# Patient Record
Sex: FEMALE | Race: WHITE | NOT HISPANIC OR LATINO | Employment: FULL TIME | URBAN - METROPOLITAN AREA
[De-identification: names, ages, dates, MRNs, and addresses within clinical notes are randomized per-mention and may not be internally consistent; named-entity substitution may affect disease eponyms.]

---

## 2019-08-08 ENCOUNTER — HOSPITAL ENCOUNTER (EMERGENCY)
Facility: HOSPITAL | Age: 34
Discharge: HOME | End: 2019-08-08
Attending: EMERGENCY MEDICINE | Admitting: EMERGENCY MEDICINE

## 2019-08-08 ENCOUNTER — APPOINTMENT (OUTPATIENT)
Dept: ULTRASOUND IMAGING | Facility: HOSPITAL | Age: 34
End: 2019-08-08

## 2019-08-08 VITALS
DIASTOLIC BLOOD PRESSURE: 85 MMHG | OXYGEN SATURATION: 99 % | TEMPERATURE: 98.1 F | HEIGHT: 69 IN | SYSTOLIC BLOOD PRESSURE: 121 MMHG | RESPIRATION RATE: 16 BRPM | HEART RATE: 80 BPM | BODY MASS INDEX: 35.55 KG/M2 | WEIGHT: 240 LBS

## 2019-08-08 DIAGNOSIS — O20.0 THREATENED MISCARRIAGE IN EARLY PREGNANCY: Primary | ICD-10-CM

## 2019-08-08 LAB
ABO GROUP BLD: NORMAL
ANTI-C: NORMAL
BASOPHILS # BLD AUTO: 0.03 10*3/MM3 (ref 0–0.2)
BASOPHILS NFR BLD AUTO: 0.2 % (ref 0–1.5)
BLD GP AB SCN SERPL QL: POSITIVE
DEPRECATED RDW RBC AUTO: 43.6 FL (ref 37–54)
EOSINOPHIL # BLD AUTO: 0.17 10*3/MM3 (ref 0–0.4)
EOSINOPHIL NFR BLD AUTO: 1.4 % (ref 0.3–6.2)
ERYTHROCYTE [DISTWIDTH] IN BLOOD BY AUTOMATED COUNT: 13.4 % (ref 12.3–15.4)
HCG INTACT+B SERPL-ACNC: 407.4 MIU/ML
HCT VFR BLD AUTO: 44.8 % (ref 34–46.6)
HGB BLD-MCNC: 13.9 G/DL (ref 12–15.9)
IMM GRANULOCYTES # BLD AUTO: 0.09 10*3/MM3 (ref 0–0.05)
IMM GRANULOCYTES NFR BLD AUTO: 0.7 % (ref 0–0.5)
LYMPHOCYTES # BLD AUTO: 2.59 10*3/MM3 (ref 0.7–3.1)
LYMPHOCYTES NFR BLD AUTO: 21.4 % (ref 19.6–45.3)
MCH RBC QN AUTO: 27.8 PG (ref 26.6–33)
MCHC RBC AUTO-ENTMCNC: 31 G/DL (ref 31.5–35.7)
MCV RBC AUTO: 89.6 FL (ref 79–97)
MONOCYTES # BLD AUTO: 1.02 10*3/MM3 (ref 0.1–0.9)
MONOCYTES NFR BLD AUTO: 8.4 % (ref 5–12)
NEUTROPHILS # BLD AUTO: 8.21 10*3/MM3 (ref 1.7–7)
NEUTROPHILS NFR BLD AUTO: 67.9 % (ref 42.7–76)
NONSPECIFIC COLD ANTIBODY: NORMAL
NRBC BLD AUTO-RTO: 0 /100 WBC (ref 0–0.2)
NUMBER OF DOSES: NORMAL
PLATELET # BLD AUTO: 316 10*3/MM3 (ref 140–450)
PMV BLD AUTO: 9.3 FL (ref 6–12)
RBC # BLD AUTO: 5 10*6/MM3 (ref 3.77–5.28)
RH BLD: NEGATIVE
WBC NRBC COR # BLD: 12.11 10*3/MM3 (ref 3.4–10.8)

## 2019-08-08 PROCEDURE — 86905 BLOOD TYPING RBC ANTIGENS: CPT

## 2019-08-08 PROCEDURE — 86850 RBC ANTIBODY SCREEN: CPT | Performed by: PHYSICIAN ASSISTANT

## 2019-08-08 PROCEDURE — 25010000003 RHO D IMMUNE GLOBULIN 250 UNITS SOLUTION PREFILLED SYRINGE: Performed by: PHYSICIAN ASSISTANT

## 2019-08-08 PROCEDURE — 86901 BLOOD TYPING SEROLOGIC RH(D): CPT | Performed by: PHYSICIAN ASSISTANT

## 2019-08-08 PROCEDURE — 76817 TRANSVAGINAL US OBSTETRIC: CPT

## 2019-08-08 PROCEDURE — 85025 COMPLETE CBC W/AUTO DIFF WBC: CPT | Performed by: PHYSICIAN ASSISTANT

## 2019-08-08 PROCEDURE — 96372 THER/PROPH/DIAG INJ SC/IM: CPT

## 2019-08-08 PROCEDURE — 86900 BLOOD TYPING SEROLOGIC ABO: CPT | Performed by: PHYSICIAN ASSISTANT

## 2019-08-08 PROCEDURE — 86870 RBC ANTIBODY IDENTIFICATION: CPT | Performed by: PHYSICIAN ASSISTANT

## 2019-08-08 PROCEDURE — 99284 EMERGENCY DEPT VISIT MOD MDM: CPT

## 2019-08-08 PROCEDURE — 84702 CHORIONIC GONADOTROPIN TEST: CPT | Performed by: PHYSICIAN ASSISTANT

## 2019-08-08 RX ADMIN — HUMAN RHO(D) IMMUNE GLOBULIN 50 MCG: 50 INJECTION, SOLUTION INTRAMUSCULAR at 13:41

## 2019-08-08 NOTE — ED PROVIDER NOTES
Subjective   Pt is a 33 yo female presenting to ED with vaginal bleeding and lower abdominal pain. She took several urine pregnancy tests at home several days that were positive. For the past 3 days she has had some small amount of vaginal spotting but this morning woke up with lower abdominal cramping and worsening bleeding with some clots. LMP 7-6-19 and hx . She has not been evaluated by OB or had an US to confirm pregnancy. Pt denies dizziness, fever, chills, CP, SOB, nausea, vomiting, diarrhea, cough or burning with urination. PMHx significant for hearing impairment with cochlear implants.         Vaginal Bleeding - Pregnant   Quality:  Bright red and spotting  Duration:  3 days  Progression:  Worsening  Chronicity:  New  Pregnancy confirmed by ultrasound: no    Gestational age:  4-5 weeks  Prenatal care:  No prenatal care  Context: not after intercourse and not genital trauma    Relieved by:  Nothing  Worsened by:  Nothing  Ineffective treatments:  None tried  Associated symptoms: abdominal pain    Associated symptoms: no back pain, no dizziness, no dyspareunia, no dysuria, no fatigue, no fever, no nausea and no vaginal discharge    Risk factors: no bleeding disorder, no hx of ectopic pregnancy and no prior miscarriage        Review of Systems   Constitutional: Negative for chills, fatigue and fever.   HENT: Negative for congestion, ear pain, sore throat and trouble swallowing.    Eyes: Negative for pain, redness and visual disturbance.   Respiratory: Negative for cough, chest tightness and shortness of breath.    Cardiovascular: Negative for chest pain and leg swelling.   Gastrointestinal: Positive for abdominal pain. Negative for constipation, diarrhea, nausea and vomiting.   Genitourinary: Positive for vaginal bleeding. Negative for difficulty urinating, dyspareunia, dysuria, flank pain, hematuria and vaginal discharge.   Musculoskeletal: Negative for arthralgias, back pain and joint swelling.   Skin:  Negative for rash and wound.   Neurological: Negative for dizziness, syncope, speech difficulty, weakness, numbness and headaches.   Psychiatric/Behavioral: Negative for confusion.   All other systems reviewed and are negative.      Past Medical History:   Diagnosis Date   • Cochlear hearing loss        No Known Allergies    Past Surgical History:   Procedure Laterality Date   • DENTAL PROCEDURE         History reviewed. No pertinent family history.    Social History     Socioeconomic History   • Marital status: Single     Spouse name: Not on file   • Number of children: Not on file   • Years of education: Not on file   • Highest education level: Not on file   Tobacco Use   • Smoking status: Former Smoker   Substance and Sexual Activity   • Alcohol use: No     Frequency: Never   • Drug use: No   • Sexual activity: Yes           Objective   Physical Exam   Constitutional: She is oriented to person, place, and time. Vital signs are normal. She appears well-developed.   HENT:   Head: Atraumatic.   Nose: Nose normal.   Mouth/Throat: Mucous membranes are normal.   Eyes: Conjunctivae, EOM and lids are normal. Pupils are equal, round, and reactive to light.   Neck: Normal range of motion. Neck supple.   Cardiovascular: Normal rate, regular rhythm and normal heart sounds.   Pulmonary/Chest: Effort normal and breath sounds normal. She has no wheezes.   Abdominal: Soft. She exhibits no distension. There is tenderness in the suprapubic area. There is no rebound, no guarding and no CVA tenderness.   Musculoskeletal: Normal range of motion. She exhibits no edema or tenderness.   Neurological: She is alert and oriented to person, place, and time. No sensory deficit.   Skin: Skin is warm and dry. No rash noted. No erythema.   Psychiatric: She has a normal mood and affect. Her speech is normal and behavior is normal.   Nursing note and vitals reviewed.      Procedures           ED Course      Re-examined patient and discussed  results. HCG quant of 407 with US findings of possible gestational sac. Discussed possible early pregnancy or early miscarriage. Discussed need for close f/u with OBGYN with in 48 hours for evaluation and repeat HCG. Dose of Rhogam given of 50mcg since under 12 weeks.     Recent Results (from the past 24 hour(s))   hCG, Quantitative, Pregnancy    Collection Time: 19 10:04 AM   Result Value Ref Range    HCG Quantitative 407.40 mIU/mL   CBC Auto Differential    Collection Time: 19 10:04 AM   Result Value Ref Range    WBC 12.11 (H) 3.40 - 10.80 10*3/mm3    RBC 5.00 3.77 - 5.28 10*6/mm3    Hemoglobin 13.9 12.0 - 15.9 g/dL    Hematocrit 44.8 34.0 - 46.6 %    MCV 89.6 79.0 - 97.0 fL    MCH 27.8 26.6 - 33.0 pg    MCHC 31.0 (L) 31.5 - 35.7 g/dL    RDW 13.4 12.3 - 15.4 %    RDW-SD 43.6 37.0 - 54.0 fl    MPV 9.3 6.0 - 12.0 fL    Platelets 316 140 - 450 10*3/mm3    Neutrophil % 67.9 42.7 - 76.0 %    Lymphocyte % 21.4 19.6 - 45.3 %    Monocyte % 8.4 5.0 - 12.0 %    Eosinophil % 1.4 0.3 - 6.2 %    Basophil % 0.2 0.0 - 1.5 %    Immature Grans % 0.7 (H) 0.0 - 0.5 %    Neutrophils, Absolute 8.21 (H) 1.70 - 7.00 10*3/mm3    Lymphocytes, Absolute 2.59 0.70 - 3.10 10*3/mm3    Monocytes, Absolute 1.02 (H) 0.10 - 0.90 10*3/mm3    Eosinophils, Absolute 0.17 0.00 - 0.40 10*3/mm3    Basophils, Absolute 0.03 0.00 - 0.20 10*3/mm3    Immature Grans, Absolute 0.09 (H) 0.00 - 0.05 10*3/mm3    nRBC 0.0 0.0 - 0.2 /100 WBC    RhIg Evaluation    Collection Time: 19 10:07 AM   Result Value Ref Range    ABO Type A     RH type Negative     Antibody Screen Positive    Doses of Rh Immune Globulin    Collection Time: 19 10:07 AM   Result Value Ref Range    Number of Doses  Injection  - Recommend 1 vial of RhIg    Antibody Identification    Collection Time: 19 10:07 AM   Result Value Ref Range    Anti-C ANTI-C     Nonspecific Cold Antibody NONSPECIFIC COLD ANTIBODY      Note: In addition to lab results from  "this visit, the labs listed above may include labs taken at another facility or during a different encounter within the last 24 hours. Please correlate lab times with ED admission and discharge times for further clarification of the services performed during this visit.    US Ob Transvaginal   Preliminary Result   There is a small hypoechoic area in the endometrial canal   measuring 1.2 x 1.2 x 1.5 cm. This is presumably an early gestational   sac. Based on these measurements the estimated age would be 5 weeks, 3   days. There is no evidence of fetal pole or yolk sac.       D:  08/08/2019   E:  08/08/2019                 Vitals:    08/08/19 0953   BP: 138/78   BP Location: Left arm   Patient Position: Sitting   Pulse: 72   Resp: 16   Temp: 98 °F (36.7 °C)   TempSrc: Oral   SpO2: 100%   Weight: 109 kg (240 lb)   Height: 175.3 cm (69\")     Medications   Rho D Immune Globulin solution prefilled syringe 50 mcg (not administered)     ECG/EMG Results (last 24 hours)     ** No results found for the last 24 hours. **        No orders to display                   MDM      Final diagnoses:   Threatened miscarriage in early pregnancy            Antonina Irvin PA  08/08/19 7605    "

## 2019-08-09 LAB — COLD SCREEN: POSITIVE

## 2019-08-13 ENCOUNTER — APPOINTMENT (OUTPATIENT)
Dept: LAB | Facility: HOSPITAL | Age: 34
End: 2019-08-13

## 2019-08-13 ENCOUNTER — INITIAL PRENATAL (OUTPATIENT)
Dept: OBSTETRICS AND GYNECOLOGY | Facility: CLINIC | Age: 34
End: 2019-08-13

## 2019-08-13 ENCOUNTER — TELEPHONE (OUTPATIENT)
Dept: OBSTETRICS AND GYNECOLOGY | Facility: CLINIC | Age: 34
End: 2019-08-13

## 2019-08-13 VITALS — DIASTOLIC BLOOD PRESSURE: 76 MMHG | SYSTOLIC BLOOD PRESSURE: 120 MMHG | WEIGHT: 238 LBS | BODY MASS INDEX: 35.15 KG/M2

## 2019-08-13 DIAGNOSIS — E66.9 OBESITY (BMI 30-39.9): ICD-10-CM

## 2019-08-13 DIAGNOSIS — O20.0 THREATENED ABORTION: Primary | ICD-10-CM

## 2019-08-13 DIAGNOSIS — O09.529 ANTEPARTUM MULTIGRAVIDA OF ADVANCED MATERNAL AGE: ICD-10-CM

## 2019-08-13 LAB — HCG INTACT+B SERPL-ACNC: 353.3 MIU/ML

## 2019-08-13 PROCEDURE — 84702 CHORIONIC GONADOTROPIN TEST: CPT | Performed by: OBSTETRICS & GYNECOLOGY

## 2019-08-13 PROCEDURE — 36415 COLL VENOUS BLD VENIPUNCTURE: CPT | Performed by: OBSTETRICS & GYNECOLOGY

## 2019-08-13 PROCEDURE — 99204 OFFICE O/P NEW MOD 45 MIN: CPT | Performed by: OBSTETRICS & GYNECOLOGY

## 2019-08-13 NOTE — PROGRESS NOTES
Chief complaint:  Pregnancy care  History of present illness:  This patient is a 34-year-old  1 para 0.  She was initially seen in the emergency department on 2019 after having been a couple of days late for her menstrual period, having vaginal bleeding and cramping.  At this time her hCG level was 400.  A transvaginal ultrasound was performed which revealed a normal pelvis with a small likely gestational sac within the uterus.  She was advised to have short interval follow-up and presents today for follow-up.  An ultrasound was performed at [facility] which revealed a normal uterus with no evidence of intrauterine pregnancy.  The patient has had some bleeding but has resolved.  Her feeling of malaise and nausea has also resolved.  We discussed the likely diagnosis of spontaneous .  We discussed the etiologies of spontaneous  and the likelihood of this being based on a chromosomal abnormality.  We discussed the management of early pregnancy failure.  We discussed the possible need for dilatation and curettage.  We discussed the likelihood that we will be able to follow this spontaneous  with serial hCGs.  The patient desires pregnancy.  I advised her to use a barrier method of contraception until the spontaneous  has completely resolved and she has had a spontaneous menstrual cycle.  Her blood type is Rh- and she received RhoGam in the emergency department  Review of systems:  14 point reviewed, negative  Vital signs:  Reviewed  Physical exam:  Not performed  Impression:  Early pregnancy failure  Advanced maternal age  Obesity  Plan:  Serial hCG  Follow-up in 6 months to assess her menstrual pattern and fertility issues if necessary  The patient may require a curettage of the uterus should her hCG remain persistent or she have prolonged or heavy vaginal bleeding.  This was a 45-minute face-to-face encounter in which the evaluation and management of spontaneous   was discussed in the context of advanced maternal age and maternal obesity.We discussed the likely diagnosis of spontaneous .  We discussed the etiologies of spontaneous  and the likelihood of this being based on a chromosomal abnormality.  We discussed the management of early pregnancy failure.  We discussed the possible need for dilatation and curettage.  We discussed the likelihood that we will be able to follow this spontaneous  with serial hCGs.

## 2019-08-13 NOTE — TELEPHONE ENCOUNTER
----- Message from Alfonzo Leone MD sent at 8/13/2019  2:14 PM EDT -----  Advise down some as expected. Lets repeat the value in one week. She can anticipate a heavier and crampier period in the next week or so. Order placed

## 2019-08-14 ENCOUNTER — OFFICE VISIT (OUTPATIENT)
Dept: INTERNAL MEDICINE | Facility: CLINIC | Age: 34
End: 2019-08-14

## 2019-08-14 ENCOUNTER — TELEPHONE (OUTPATIENT)
Dept: INTERNAL MEDICINE | Facility: CLINIC | Age: 34
End: 2019-08-14

## 2019-08-14 VITALS
HEIGHT: 69 IN | WEIGHT: 237.4 LBS | SYSTOLIC BLOOD PRESSURE: 160 MMHG | HEART RATE: 92 BPM | DIASTOLIC BLOOD PRESSURE: 108 MMHG | OXYGEN SATURATION: 96 % | RESPIRATION RATE: 16 BRPM | TEMPERATURE: 97.8 F | BODY MASS INDEX: 35.16 KG/M2

## 2019-08-14 DIAGNOSIS — Z13.220 LIPID SCREENING: ICD-10-CM

## 2019-08-14 DIAGNOSIS — E53.8 VITAMIN B 12 DEFICIENCY: ICD-10-CM

## 2019-08-14 DIAGNOSIS — E55.9 VITAMIN D DEFICIENCY: ICD-10-CM

## 2019-08-14 DIAGNOSIS — F33.1 MODERATE EPISODE OF RECURRENT MAJOR DEPRESSIVE DISORDER (HCC): ICD-10-CM

## 2019-08-14 DIAGNOSIS — D22.9 NEVUS: ICD-10-CM

## 2019-08-14 DIAGNOSIS — R53.83 FATIGUE, UNSPECIFIED TYPE: ICD-10-CM

## 2019-08-14 DIAGNOSIS — F41.9 ANXIETY: Primary | ICD-10-CM

## 2019-08-14 LAB
25(OH)D3 SERPL-MCNC: 28.6 NG/ML (ref 30–100)
ALBUMIN SERPL-MCNC: 4.6 G/DL (ref 3.5–5.2)
ALBUMIN/GLOB SERPL: 1.6 G/DL
ALP SERPL-CCNC: 60 U/L (ref 39–117)
ALT SERPL W P-5'-P-CCNC: 12 U/L (ref 1–33)
ANION GAP SERPL CALCULATED.3IONS-SCNC: 13.7 MMOL/L (ref 5–15)
AST SERPL-CCNC: 12 U/L (ref 1–32)
BILIRUB SERPL-MCNC: 0.3 MG/DL (ref 0.2–1.2)
BUN BLD-MCNC: 8 MG/DL (ref 6–20)
BUN/CREAT SERPL: 14 (ref 7–25)
CALCIUM SPEC-SCNC: 9.5 MG/DL (ref 8.6–10.5)
CHLORIDE SERPL-SCNC: 102 MMOL/L (ref 98–107)
CHOLEST SERPL-MCNC: 225 MG/DL (ref 0–200)
CO2 SERPL-SCNC: 24.3 MMOL/L (ref 22–29)
CREAT BLD-MCNC: 0.57 MG/DL (ref 0.57–1)
DEPRECATED RDW RBC AUTO: 45.7 FL (ref 37–54)
ERYTHROCYTE [DISTWIDTH] IN BLOOD BY AUTOMATED COUNT: 13.4 % (ref 12.3–15.4)
GFR SERPL CREATININE-BSD FRML MDRD: 121 ML/MIN/1.73
GLOBULIN UR ELPH-MCNC: 2.8 GM/DL
GLUCOSE BLD-MCNC: 86 MG/DL (ref 65–99)
HCT VFR BLD AUTO: 46.7 % (ref 34–46.6)
HDLC SERPL-MCNC: 72 MG/DL (ref 40–60)
HGB BLD-MCNC: 14.4 G/DL (ref 12–15.9)
LDLC SERPL CALC-MCNC: 125 MG/DL (ref 0–100)
LDLC/HDLC SERPL: 1.74 {RATIO}
MCH RBC QN AUTO: 28.2 PG (ref 26.6–33)
MCHC RBC AUTO-ENTMCNC: 30.8 G/DL (ref 31.5–35.7)
MCV RBC AUTO: 91.4 FL (ref 79–97)
PLATELET # BLD AUTO: 336 10*3/MM3 (ref 140–450)
PMV BLD AUTO: 10.3 FL (ref 6–12)
POTASSIUM BLD-SCNC: 4.1 MMOL/L (ref 3.5–5.2)
PROT SERPL-MCNC: 7.4 G/DL (ref 6–8.5)
RBC # BLD AUTO: 5.11 10*6/MM3 (ref 3.77–5.28)
SODIUM BLD-SCNC: 140 MMOL/L (ref 136–145)
T4 FREE SERPL-MCNC: 1.17 NG/DL (ref 0.93–1.7)
TRIGL SERPL-MCNC: 140 MG/DL (ref 0–150)
TSH SERPL DL<=0.05 MIU/L-ACNC: 1.14 MIU/ML (ref 0.27–4.2)
VIT B12 BLD-MCNC: 287 PG/ML (ref 211–946)
VLDLC SERPL-MCNC: 28 MG/DL (ref 5–40)
WBC NRBC COR # BLD: 11.32 10*3/MM3 (ref 3.4–10.8)

## 2019-08-14 PROCEDURE — 84443 ASSAY THYROID STIM HORMONE: CPT | Performed by: INTERNAL MEDICINE

## 2019-08-14 PROCEDURE — 82607 VITAMIN B-12: CPT | Performed by: INTERNAL MEDICINE

## 2019-08-14 PROCEDURE — 82306 VITAMIN D 25 HYDROXY: CPT | Performed by: INTERNAL MEDICINE

## 2019-08-14 PROCEDURE — 99203 OFFICE O/P NEW LOW 30 MIN: CPT | Performed by: INTERNAL MEDICINE

## 2019-08-14 PROCEDURE — 84439 ASSAY OF FREE THYROXINE: CPT | Performed by: INTERNAL MEDICINE

## 2019-08-14 PROCEDURE — 80061 LIPID PANEL: CPT | Performed by: INTERNAL MEDICINE

## 2019-08-14 PROCEDURE — 85027 COMPLETE CBC AUTOMATED: CPT | Performed by: INTERNAL MEDICINE

## 2019-08-14 PROCEDURE — 36415 COLL VENOUS BLD VENIPUNCTURE: CPT | Performed by: INTERNAL MEDICINE

## 2019-08-14 PROCEDURE — 80053 COMPREHEN METABOLIC PANEL: CPT | Performed by: INTERNAL MEDICINE

## 2019-08-14 RX ORDER — ESCITALOPRAM OXALATE 10 MG/1
10 TABLET ORAL DAILY
Qty: 30 TABLET | Refills: 3 | Status: SHIPPED | OUTPATIENT
Start: 2019-08-14 | End: 2019-08-16 | Stop reason: SDUPTHER

## 2019-08-14 NOTE — PROGRESS NOTES
Subjective   Bonita Ellison is a 34 y.o. female.     History of Present Illness     The following portions of the patient's history were reviewed and updated as appropriate: allergies, current medications, past family history, past medical history, past social history, past surgical history and problem list.    Establish visit    1.White blood count elevation- she says that her white count has been between 14-17 but it is never normal.  Patient says that she would like to have this checked to make sure everything is okay.    2 fatigue/crying- over the past 6 to 8 months patient has been having increased fatigue, crying episodes, feeling down, feeling isolated, and is concerned about the possibility of depression.  Patient denies any suicidal ideations, threats towards herself or anybody else, no hallucinations.    3 hair is falling out-during the stressful time she has been losing clumps of hair and denies any changes in her cosmetic products.    4 mole on skin-patient has a mole on her left forearm and also around the buttocks region.      Past medical History   Anxiety   Depression     Family History   Arthritis  Diabetes  Stroke  Obesity  Hyperlipidemia    Social History: + Cigarette smoking, no EtOH consumption, no IV drug use, no marijuana, no illicit drugs.        Review of Systems   Constitutional: Negative.    HENT: Negative.    Respiratory: Negative.    Endocrine: Negative.    Genitourinary: Negative.    Neurological: Negative for dizziness.   Psychiatric/Behavioral: Positive for decreased concentration, depressed mood and stress. Negative for hallucinations, self-injury, sleep disturbance and suicidal ideas. The patient is nervous/anxious.    All other systems reviewed and are negative.      Objective   Physical Exam   Constitutional: She is oriented to person, place, and time. She appears well-developed and well-nourished.   HENT:   Head: Normocephalic.   Right Ear: External ear normal.   Left Ear: External  ear normal.   Nose: Nose normal.   Mouth/Throat: Oropharynx is clear and moist.   Eyes: Conjunctivae and EOM are normal. Pupils are equal, round, and reactive to light.   Neck: Normal range of motion. Neck supple.   Cardiovascular: Normal rate, regular rhythm and normal heart sounds.   Pulmonary/Chest: Effort normal and breath sounds normal.   Abdominal: Soft. Bowel sounds are normal.   Musculoskeletal: Normal range of motion.   Neurological: She is alert and oriented to person, place, and time.   Skin: Skin is warm.   Psychiatric: She has a normal mood and affect. Her behavior is normal. Judgment and thought content normal.   Nursing note and vitals reviewed.        Assessment/Plan   Bonita was seen today for establish care and miscarriage.    Diagnoses and all orders for this visit:    Spent 30 minutes of 30 minutes face-to-face with patient, greater than 50% of that time was used to discuss chronic issues including depression/anxiety and management.    Anxiety  -     CBC (No Diff)  -     Comprehensive Metabolic Panel  -     T4, Free  -     TSH  -     escitalopram (LEXAPRO) 10 MG tablet; Take 1 tablet by mouth Daily.    Moderate episode of recurrent major depressive disorder (CMS/HCC)  -     escitalopram (LEXAPRO) 10 MG tablet; Take 1 tablet by mouth Daily.    Side effects and precautions of the new medication(s) have been discussed with patient  I have answered patients questions thoroughly to their understanding.  If patient should experience any side effects from the medication, a follow up appointment should be made.      Lipid screening  -     Lipid Panel    Fatigue, unspecified type    Vitamin B 12 deficiency  -     Vitamin B12; Future    Vitamin D deficiency  -     Vitamin D 25 hydroxy; Future

## 2019-08-14 NOTE — TELEPHONE ENCOUNTER
----- Message from Gracia Huston sent at 8/14/2019 12:00 PM EDT -----  At checkout patient requested we call [phone] when we have her information for her dermatologist appointment.

## 2019-08-15 ENCOUNTER — TELEPHONE (OUTPATIENT)
Dept: INTERNAL MEDICINE | Facility: CLINIC | Age: 34
End: 2019-08-15

## 2019-08-15 DIAGNOSIS — F33.1 MODERATE EPISODE OF RECURRENT MAJOR DEPRESSIVE DISORDER (HCC): ICD-10-CM

## 2019-08-15 DIAGNOSIS — F41.9 ANXIETY: ICD-10-CM

## 2019-08-15 NOTE — TELEPHONE ENCOUNTER
----- Message from Hannah Izaguirre sent at 8/15/2019 11:02 AM EDT -----  Contact: Pt's mother  The pharmacy never received the RX for her escitalopram (LEXAPRO) 10 MG tablet.  Can it be sent again.    To [facility] in [place], [place]

## 2019-08-15 NOTE — TELEPHONE ENCOUNTER
Patient's mother called and was advised to have patient have repeat lab work done in one week.  She would also anticipate a heavier and crampier period in the next week or so

## 2019-08-15 NOTE — TELEPHONE ENCOUNTER
Looks like it was printed and not called in. I called [facility] pharmacy and they stated that patient has not ever used their pharmacy before. Tried to call patient no answer LVM to CB.

## 2019-08-16 RX ORDER — ESCITALOPRAM OXALATE 10 MG/1
10 TABLET ORAL DAILY
Qty: 30 TABLET | Refills: 3 | Status: SHIPPED | OUTPATIENT
Start: 2019-08-16 | End: 2023-01-24

## 2019-08-16 NOTE — TELEPHONE ENCOUNTER
Looks like Dr Cunningham [facility] [place].  Left detailed message that Bonita can call [facility] and have them transfer the rx from [facility] to [facility] and to call the on call # if there is a problem as Dr cunningham in on call this weekend

## 2019-08-16 NOTE — TELEPHONE ENCOUNTER
Patient called and states the medication needs to be sent to [facility]. She can be reached at [phone]

## 2019-08-21 ENCOUNTER — TELEPHONE (OUTPATIENT)
Dept: OBSTETRICS AND GYNECOLOGY | Facility: CLINIC | Age: 34
End: 2019-08-21

## 2019-08-21 NOTE — TELEPHONE ENCOUNTER
Patient's mother advised that patient needs repeat labs done.  She may also take advil or ibuprofen for pain.  She was also told in previous telephone call to expect severe cramping and bleeding.  Bleeding may continue until HCG level have reached normal level.  I also asked patient's mother what her temperature was.  She stated patient doesn't have thermometer.  Patient is in [place] and mother is in [place] trying to take care of patient from a distant.  Mother states she will have patient to repeat labs today or tomorrow. She will call office to update us.  She may also need a note for work.

## 2019-08-21 NOTE — TELEPHONE ENCOUNTER
ODESSA PT: Pt still bleeding after miscarriage/ cramping really bad, slight fever, weak,nausa.Her mother feels like she needs to be seen before 9-23.  Mother stated Dr lourdes lynch D&C was not needed. Patient states she is in so much pain. Please call mother at [phone].

## 2019-08-22 ENCOUNTER — LAB (OUTPATIENT)
Dept: LAB | Facility: HOSPITAL | Age: 34
End: 2019-08-22

## 2019-08-22 DIAGNOSIS — O20.0 THREATENED ABORTION: ICD-10-CM

## 2019-08-22 LAB — HCG INTACT+B SERPL-ACNC: 455.4 MIU/ML

## 2019-08-22 PROCEDURE — 84702 CHORIONIC GONADOTROPIN TEST: CPT

## 2019-08-22 PROCEDURE — 36415 COLL VENOUS BLD VENIPUNCTURE: CPT

## 2019-08-23 ENCOUNTER — DOCUMENTATION (OUTPATIENT)
Dept: OBSTETRICS AND GYNECOLOGY | Facility: CLINIC | Age: 34
End: 2019-08-23

## 2019-08-23 ENCOUNTER — HOSPITAL ENCOUNTER (OUTPATIENT)
Dept: ONCOLOGY | Facility: HOSPITAL | Age: 34
Setting detail: INFUSION SERIES
Discharge: HOME | End: 2019-08-23

## 2019-08-23 VITALS
WEIGHT: 241 LBS | SYSTOLIC BLOOD PRESSURE: 155 MMHG | DIASTOLIC BLOOD PRESSURE: 79 MMHG | RESPIRATION RATE: 16 BRPM | HEART RATE: 78 BPM | TEMPERATURE: 98.5 F | BODY MASS INDEX: 35.7 KG/M2 | HEIGHT: 69 IN

## 2019-08-23 DIAGNOSIS — O02.1 MISSED ABORTION: ICD-10-CM

## 2019-08-23 DIAGNOSIS — Z34.90 PREGNANCY, UNSPECIFIED GESTATIONAL AGE: Primary | ICD-10-CM

## 2019-08-23 DIAGNOSIS — O36.80X0 PREGNANCY, LOCATION UNKNOWN: Primary | ICD-10-CM

## 2019-08-23 DIAGNOSIS — O20.0 THREATENED ABORTION: Primary | ICD-10-CM

## 2019-08-23 PROCEDURE — 96401 CHEMO ANTI-NEOPL SQ/IM: CPT

## 2019-08-23 PROCEDURE — 25010000003 METHOTREXATE SODIUM PER 50 MG: Performed by: OBSTETRICS & GYNECOLOGY

## 2019-08-23 RX ORDER — METHOTREXATE 25 MG/ML
50 INJECTION INTRA-ARTERIAL; INTRAMUSCULAR; INTRATHECAL; INTRAVENOUS ONCE
Status: COMPLETED | OUTPATIENT
Start: 2019-08-23 | End: 2019-08-23

## 2019-08-23 RX ORDER — METHOTREXATE 25 MG/ML
50 INJECTION INTRA-ARTERIAL; INTRAMUSCULAR; INTRATHECAL; INTRAVENOUS ONCE
Status: CANCELLED | OUTPATIENT
Start: 2019-08-23

## 2019-08-23 RX ORDER — METHOTREXATE 25 MG/ML
50 INJECTION INTRA-ARTERIAL; INTRAMUSCULAR; INTRATHECAL; INTRAVENOUS ONCE
Status: DISCONTINUED | OUTPATIENT
Start: 2019-08-23 | End: 2023-01-24

## 2019-08-23 RX ADMIN — METHOTREXATE 112 MG: 25 SOLUTION INTRA-ARTERIAL; INTRAMUSCULAR; INTRATHECAL; INTRAVENOUS at 16:44

## 2019-08-23 NOTE — PROGRESS NOTES
Labs reviewed  HCG levels are not falling as one would expect with a complete .  I believe that she had evidence of an IUP on ED performed ultrasound which not only did not show progress on repeat but was in fact absent.  Most likely I think we are dealing an early failure of an IUP, but this is not absolute. Clearly this is a failed early pregnancy which I will address as one of unknown anatomic location.  She is currently in a stable condition and through her mother I discussed treatment options. All questions were answered. ( the patient has profound hearing loss and communication was vis her mother)  We will proceed with MTX, 50mg/M2 and follow serial hcg values. Warning signs of ectopic were communicated.  Repeat hcg 1 week

## 2019-08-23 NOTE — PROGRESS NOTES
Patient has cochlear implant, cannot hear over the phone.  Her mom spoke with MD office earlier today re: patient's labs, MTx  Treatment.  I Spoke with Dr Leone's nurse to clarify that patient was educated on her condition.  Patient's questions were answered and she was comfortable receiving the MTX treament injection.  Explained that she should call MD office re: f/o labs. Patient v/u.

## 2019-08-26 ENCOUNTER — LAB (OUTPATIENT)
Dept: LAB | Facility: HOSPITAL | Age: 34
End: 2019-08-26

## 2019-08-26 ENCOUNTER — TELEPHONE (OUTPATIENT)
Dept: OBSTETRICS AND GYNECOLOGY | Facility: CLINIC | Age: 34
End: 2019-08-26

## 2019-08-26 DIAGNOSIS — O20.0 THREATENED ABORTION: ICD-10-CM

## 2019-08-26 DIAGNOSIS — O02.1 MISSED ABORTION: Primary | ICD-10-CM

## 2019-08-26 LAB — HCG INTACT+B SERPL-ACNC: 375.1 MIU/ML

## 2019-08-26 PROCEDURE — 84702 CHORIONIC GONADOTROPIN TEST: CPT

## 2019-08-26 PROCEDURE — 36415 COLL VENOUS BLD VENIPUNCTURE: CPT

## 2019-08-26 NOTE — TELEPHONE ENCOUNTER
----- Message from Alfonzo Leone MD sent at 8/26/2019  2:16 PM EDT -----  Advise hcg levels beginning to fall as expected. Needs repeat in one week. Ordrered.

## 2019-08-26 NOTE — TELEPHONE ENCOUNTER
Mother calling about shot she got Friday and lab work needed (HCG) Information given as to where she needs to go

## 2019-08-27 ENCOUNTER — TELEPHONE (OUTPATIENT)
Dept: ONCOLOGY | Facility: HOSPITAL | Age: 34
End: 2019-08-27

## 2019-08-30 NOTE — TELEPHONE ENCOUNTER
Patient has significant hearing loss and has signed forms to give her mother, Carmencita Li,  access to medical care needs.   Patient's mother stated that Bonita has had some spotting, soreness in lower abdomen, low grade fever.  She had labs- beta hcg  drawn yesterday and is waiting for f/u from MD office. Emotionally she states Bonita is doing okay.  Mother states she  wants to talk with physician more about her daughter's  condition.  Encouraged mother/ patient to call the MD office and explain concerns and have questions answered.   Patient's mother verbalized understanding.   Begin with liquids and light food ( tea, toast, Jello, soups). Advance to what you normally eat. Liquids should taken in adequate amounts today.     CALL the DOCTOR:    -Fever greater than  101F  - Signs  of infection such as : increase pain,swelling,redness,or a bad  smell coming from the wound.  -Excessive amount of bleeding.  - Any pain that appears to be getting worse.  - Vomiting  -  If you have  not urinated 8 hours after surgery or have any difficulty urinating.     A responsible adult should be with you for the rest of the day and night for your safety and to help you if you needed.    Review attached FACT SHEET if applicable.

## 2019-09-02 ENCOUNTER — LAB (OUTPATIENT)
Dept: LAB | Facility: HOSPITAL | Age: 34
End: 2019-09-02

## 2019-09-02 DIAGNOSIS — O02.1 MISSED ABORTION: ICD-10-CM

## 2019-09-02 LAB — HCG INTACT+B SERPL-ACNC: 126.7 MIU/ML

## 2019-09-02 PROCEDURE — 36415 COLL VENOUS BLD VENIPUNCTURE: CPT

## 2019-09-02 PROCEDURE — 84702 CHORIONIC GONADOTROPIN TEST: CPT

## 2019-09-03 ENCOUNTER — TELEPHONE (OUTPATIENT)
Dept: OBSTETRICS AND GYNECOLOGY | Facility: CLINIC | Age: 34
End: 2019-09-03

## 2019-09-03 DIAGNOSIS — O02.1 MISSED ABORTION: Primary | ICD-10-CM

## 2019-09-03 NOTE — TELEPHONE ENCOUNTER
----- Message from Alfonzo Leone MD sent at 9/3/2019 12:44 PM EDT -----  HCG continues to fall as expected. Repeat in one week and weekly until zero. Order placed

## 2019-09-10 ENCOUNTER — LAB (OUTPATIENT)
Dept: LAB | Facility: HOSPITAL | Age: 34
End: 2019-09-10

## 2019-09-10 DIAGNOSIS — O02.1 MISSED ABORTION: ICD-10-CM

## 2019-09-10 PROCEDURE — 84702 CHORIONIC GONADOTROPIN TEST: CPT

## 2019-09-10 PROCEDURE — 36415 COLL VENOUS BLD VENIPUNCTURE: CPT

## 2019-09-11 ENCOUNTER — TELEPHONE (OUTPATIENT)
Dept: OBSTETRICS AND GYNECOLOGY | Facility: CLINIC | Age: 34
End: 2019-09-11

## 2019-09-11 DIAGNOSIS — O02.1 MISSED ABORTION: Primary | ICD-10-CM

## 2019-09-11 LAB — HCG INTACT+B SERPL-ACNC: 30.2 MIU/ML

## 2019-09-11 NOTE — TELEPHONE ENCOUNTER
----- Message from Alfonzo Leone MD sent at 9/11/2019  9:45 AM EDT -----  Advise. Would liike to rep[eat in one week, Hopefully zero by then and follow up appt in about three months if not already scheduled sooner

## 2019-09-17 ENCOUNTER — LAB (OUTPATIENT)
Dept: LAB | Facility: HOSPITAL | Age: 34
End: 2019-09-17

## 2019-09-17 DIAGNOSIS — O02.1 MISSED ABORTION: ICD-10-CM

## 2019-09-17 PROCEDURE — 84702 CHORIONIC GONADOTROPIN TEST: CPT

## 2019-09-17 PROCEDURE — 36415 COLL VENOUS BLD VENIPUNCTURE: CPT

## 2019-09-18 DIAGNOSIS — O02.1 MISSED ABORTION: Primary | ICD-10-CM

## 2019-09-18 LAB — HCG INTACT+B SERPL-ACNC: 9.67 MIU/ML

## 2019-09-23 ENCOUNTER — APPOINTMENT (OUTPATIENT)
Dept: LAB | Facility: HOSPITAL | Age: 34
End: 2019-09-23

## 2019-09-23 ENCOUNTER — OFFICE VISIT (OUTPATIENT)
Dept: OBSTETRICS AND GYNECOLOGY | Facility: CLINIC | Age: 34
End: 2019-09-23

## 2019-09-23 VITALS
DIASTOLIC BLOOD PRESSURE: 76 MMHG | WEIGHT: 241 LBS | HEIGHT: 69 IN | BODY MASS INDEX: 35.7 KG/M2 | SYSTOLIC BLOOD PRESSURE: 120 MMHG

## 2019-09-23 DIAGNOSIS — O02.1 MISSED ABORTION: ICD-10-CM

## 2019-09-23 DIAGNOSIS — E66.9 OBESITY (BMI 30-39.9): Primary | ICD-10-CM

## 2019-09-23 LAB — HCG INTACT+B SERPL-ACNC: 3.81 MIU/ML

## 2019-09-23 PROCEDURE — 36415 COLL VENOUS BLD VENIPUNCTURE: CPT | Performed by: OBSTETRICS & GYNECOLOGY

## 2019-09-23 PROCEDURE — 99213 OFFICE O/P EST LOW 20 MIN: CPT | Performed by: OBSTETRICS & GYNECOLOGY

## 2019-09-23 PROCEDURE — 84702 CHORIONIC GONADOTROPIN TEST: CPT | Performed by: OBSTETRICS & GYNECOLOGY

## 2019-09-23 RX ORDER — PRENATAL WITH FERROUS FUM AND FOLIC ACID 3080; 920; 120; 400; 22; 1.84; 3; 20; 10; 1; 12; 200; 27; 25; 2 [IU]/1; [IU]/1; MG/1; [IU]/1; MG/1; MG/1; MG/1; MG/1; MG/1; MG/1; UG/1; MG/1; MG/1; MG/1; MG/1
1 TABLET ORAL DAILY
Qty: 100 TABLET | Refills: 5 | Status: SHIPPED | OUTPATIENT
Start: 2019-09-23 | End: 2019-10-18

## 2019-09-23 NOTE — PROGRESS NOTES
Chief complaint:  Follow-up early pregnancy failure  History of present illness:  This patient was treated with methotrexate for early pregnancy failure possible ectopic pregnancy.  Her hCGs have almost fallen to 0.  She has not had a normal period.  She has resumed sexual activity she has normal bowel and bladder function we discussed future pregnancy.  We discussed contraception.  We discussed continuing prenatal vitamins.  We discussed her resuming Celexa.  She is due for an annual exam.  She is in general doing well from a state of mind standpoint  Review of systems:  14 point reviewed negative  Vital signs:  Reviewed  Physical exam: Not performed  Impression:  Early pregnancy failure essentially resolved  Plan:  Continue prenatal vitamins  Continue Celexa  Follow-up in 3 months for annual exam  hCG today

## 2019-09-24 ENCOUNTER — TELEPHONE (OUTPATIENT)
Dept: OBSTETRICS AND GYNECOLOGY | Facility: CLINIC | Age: 34
End: 2019-09-24

## 2019-10-18 ENCOUNTER — OFFICE VISIT (OUTPATIENT)
Dept: INTERNAL MEDICINE | Facility: CLINIC | Age: 34
End: 2019-10-18

## 2019-10-18 VITALS
DIASTOLIC BLOOD PRESSURE: 80 MMHG | TEMPERATURE: 97.6 F | OXYGEN SATURATION: 99 % | RESPIRATION RATE: 20 BRPM | HEART RATE: 90 BPM | WEIGHT: 240 LBS | BODY MASS INDEX: 35.55 KG/M2 | SYSTOLIC BLOOD PRESSURE: 128 MMHG | HEIGHT: 69 IN

## 2019-10-18 DIAGNOSIS — M54.31 RIGHT SCIATIC NERVE PAIN: ICD-10-CM

## 2019-10-18 DIAGNOSIS — M54.41 CHRONIC RIGHT-SIDED LOW BACK PAIN WITH RIGHT-SIDED SCIATICA: Primary | ICD-10-CM

## 2019-10-18 DIAGNOSIS — G89.29 CHRONIC RIGHT-SIDED LOW BACK PAIN WITH RIGHT-SIDED SCIATICA: Primary | ICD-10-CM

## 2019-10-18 PROCEDURE — 99214 OFFICE O/P EST MOD 30 MIN: CPT | Performed by: INTERNAL MEDICINE

## 2019-10-18 RX ORDER — GABAPENTIN 300 MG/1
CAPSULE ORAL
Qty: 45 CAPSULE | Refills: 1 | Status: SHIPPED | OUTPATIENT
Start: 2019-10-18 | End: 2019-10-28

## 2019-10-18 RX ORDER — MELOXICAM 15 MG/1
15 TABLET ORAL DAILY
Qty: 30 TABLET | Refills: 3 | Status: SHIPPED | OUTPATIENT
Start: 2019-10-18 | End: 2023-01-24

## 2019-10-18 NOTE — PROGRESS NOTES
Subjective   Bonita Ellison is a 34 y.o. female.     History of Present Illness     The following portions of the patient's history were reviewed and updated as appropriate: allergies, current medications, past family history, past medical history, past social history, past surgical history and problem list.    Low back pain acute  Patient says that she was at work when she developed   Localized to the right gluteal and radiates the right leg.  Patient says that the pain is worse when she is standing and sometimes relieved when she sits.  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  Patient says that she has tried aspirin medication and this has helped somewhat with the anti-inflammatory      Review of Systems   All other systems reviewed and are negative.      Objective   Physical Exam   Constitutional: She is oriented to person, place, and time. She appears well-developed and well-nourished.   HENT:   Head: Normocephalic and atraumatic.   Nose: Nose normal.   Mouth/Throat: Oropharynx is clear and moist.   Eyes: Conjunctivae and EOM are normal. Pupils are equal, round, and reactive to light.   Neck: Normal range of motion. Neck supple.   Cardiovascular: Normal rate, regular rhythm and normal heart sounds.   Pulmonary/Chest: Effort normal and breath sounds normal.   Musculoskeletal: Normal range of motion.   Point tenderness to the right gluteal region   Neurological: She is alert and oriented to person, place, and time.   Nursing note and vitals reviewed.        Assessment/Plan   Bonita was seen today for back pain.    Diagnoses and all orders for this visit:    Chronic right-sided low back pain with right-sided sciatica    Right sciatic nerve pain (new medication start)  -     meloxicam (MOBIC) 15 MG tablet; Take 1 tablet by mouth Daily.  -     gabapentin (NEURONTIN) 300 MG capsule; Take 1 tablet by mouth twice a day  as needed for nerve pain    Side effects and precautions of the new medication(s) have been discussed with patient  I have answered patients questions thoroughly to their understanding.  If patient should experience any side effects from the medication, a follow up appointment should be made.

## 2019-10-28 ENCOUNTER — OFFICE VISIT (OUTPATIENT)
Dept: INTERNAL MEDICINE | Facility: CLINIC | Age: 34
End: 2019-10-28

## 2019-10-28 ENCOUNTER — TELEPHONE (OUTPATIENT)
Dept: INTERNAL MEDICINE | Facility: CLINIC | Age: 34
End: 2019-10-28

## 2019-10-28 VITALS
DIASTOLIC BLOOD PRESSURE: 80 MMHG | RESPIRATION RATE: 20 BRPM | BODY MASS INDEX: 35.94 KG/M2 | TEMPERATURE: 97.7 F | OXYGEN SATURATION: 98 % | WEIGHT: 243.38 LBS | HEART RATE: 67 BPM | SYSTOLIC BLOOD PRESSURE: 126 MMHG

## 2019-10-28 DIAGNOSIS — M54.31 RIGHT SCIATIC NERVE PAIN: ICD-10-CM

## 2019-10-28 DIAGNOSIS — M54.41 CHRONIC RIGHT-SIDED LOW BACK PAIN WITH RIGHT-SIDED SCIATICA: Primary | ICD-10-CM

## 2019-10-28 DIAGNOSIS — G89.29 CHRONIC RIGHT-SIDED LOW BACK PAIN WITH RIGHT-SIDED SCIATICA: Primary | ICD-10-CM

## 2019-10-28 PROCEDURE — 99213 OFFICE O/P EST LOW 20 MIN: CPT | Performed by: INTERNAL MEDICINE

## 2019-10-28 RX ORDER — GABAPENTIN 600 MG/1
600 TABLET ORAL 3 TIMES DAILY
Qty: 90 TABLET | Refills: 0 | Status: SHIPPED | OUTPATIENT
Start: 2019-10-28 | End: 2023-01-24

## 2019-10-28 NOTE — TELEPHONE ENCOUNTER
Spoke with mom and told her to make an appt with Dr Nash and if there is no openings she can be scheduled with another provider, She was transferred to [facility] for appt.

## 2019-10-28 NOTE — TELEPHONE ENCOUNTER
PATIENT'S MOTHER CALLED IN REGARDS TO A LETTER THAT THE PATIENT RECEIVED FROM HER EMPLOYER STATING THAT IF SHE DID NOT GET IN TO SEE HER PRIMARY CARED DOCTOR BY NOV,15,2019 SHE COULD BE FIRED. SO THE PT'S MOTHER WOULD LIKE TO GET THE PATIENT SCHEDULED AS SOON AS POSSIBLE AND ALSO GET A LETTER FROM HER PROVIDER STATING THAT SHE WAS IN THE OFFICE. MRS WELCH WOULD LIKE TO GET A CALL BACK AS SOON AS POSSIBLE IN REGARDS TO THIS MATTER [phone]

## 2019-10-28 NOTE — PROGRESS NOTES
Subjective   Bonita Ellison is a 34 y.o. female.     History of Present Illness     The following portions of the patient's history were reviewed and updated as appropriate: allergies, current medications, past family history, past medical history, past social history, past surgical history and problem list.      1 low back pain/ right leg-patient says that her right leg continues to cause pain.  She continues her pain medications which has helped improve her overall quality life and her ability to perform activities of daily living.    2 knee pain chronic stable.  Patient continues to have knee pain.  Pain is made worse with flexion, extension, and weightbearing.    She is continue with physical therapy   Review of Systems   All other systems reviewed and are negative.      Objective   Physical Exam   Constitutional: She is oriented to person, place, and time. She appears well-developed and well-nourished.   HENT:   Head: Normocephalic and atraumatic.   Nose: Nose normal.   Mouth/Throat: Oropharynx is clear and moist.   Eyes: Conjunctivae and EOM are normal. Pupils are equal, round, and reactive to light.   Neck: Normal range of motion. Neck supple.   Cardiovascular: Normal rate, regular rhythm and normal heart sounds.   Pulmonary/Chest: Effort normal and breath sounds normal.   Musculoskeletal: Normal range of motion.   Neurological: She is alert and oriented to person, place, and time.   Nursing note and vitals reviewed.        Assessment/Plan   Bonita was seen today for fmla and med refill.    Diagnoses and all orders for this visit:    Chronic right-sided low back pain with right-sided sciatica    Right sciatic nerve pain    Other orders  -     gabapentin (NEURONTIN) 600 MG tablet; Take 1 tablet by mouth 3 (Three) Times a Day.    Continue meloxicam 15 mg by mouth once a day.

## 2020-04-08 ENCOUNTER — HOSPITAL ENCOUNTER (EMERGENCY)
Facility: HOSPITAL | Age: 35
Discharge: LEFT BEFORE DISCHARGE | End: 2020-04-08

## 2020-04-08 VITALS
HEART RATE: 79 BPM | TEMPERATURE: 98.9 F | SYSTOLIC BLOOD PRESSURE: 133 MMHG | HEIGHT: 69 IN | WEIGHT: 225 LBS | BODY MASS INDEX: 33.33 KG/M2 | RESPIRATION RATE: 20 BRPM | DIASTOLIC BLOOD PRESSURE: 89 MMHG | OXYGEN SATURATION: 99 %

## 2020-04-08 PROCEDURE — 99211 OFF/OP EST MAY X REQ PHY/QHP: CPT

## 2020-04-08 NOTE — ED NOTES
Pt left. States she can't wait any longer. Spouse transported her to [facility].     Yolie Bearden RN  04/08/20 [id number]

## 2020-11-16 RX ORDER — VITAMIN A, VITAMIN C, VITAMIN D-3, VITAMIN E, VITAMIN B-1, VITAMIN B-2, NIACIN, VITAMIN B-6, CALCIUM, IRON, ZINC, COPPER 4000; 120; 400; 22; 1.84; 3; 20; 10; 1; 12; 200; 27; 25; 2 [IU]/1; MG/1; [IU]/1; MG/1; MG/1; MG/1; MG/1; MG/1; MG/1; UG/1; MG/1; MG/1; MG/1; MG/1
TABLET ORAL
Qty: 90 TABLET | Refills: 4 | OUTPATIENT
Start: 2020-11-16

## 2022-12-28 ENCOUNTER — TELEPHONE (OUTPATIENT)
Dept: FAMILY MEDICINE CLINIC | Facility: CLINIC | Age: 37
End: 2022-12-28

## 2022-12-28 NOTE — TELEPHONE ENCOUNTER
Typically I would be willing to accommodate this for patients with hearing impairment.  If she has impaired hearing this should be reasonable. The only exception would be if any of the parties (myself, patient or the person accompanying her) were sick with fever or signifcant respiratory symptoms we might would ask her to reschedule for a different day, but for now I would plan on doing this for her.

## 2022-12-28 NOTE — TELEPHONE ENCOUNTER
Caller: LATONIA LANE    Relationship: Brother/Sister    Best call back number: [phone]    What is the best time to reach you: ANY    Who are you requesting to speak with (clinical staff, provider,  specific staff member): DR. COLLADO    What was the call regarding: THE PATIENT IS CURRENTLY SCHEDULED TO SEE DR. COLLADO ON 1.24.23, BUT WILL NEED TO BE ACCOMMODATED DURING THE VISIT BY A PHYSICIAN WHO IS WILLING TO UNMASK WHILE SPEAKING SO THAT SHE CAN LIP READ. THE PATIENT WILL BE MASKED, BUT HER BROTHER WILL NEED TO BE UNMASKED SO THAT HE CAN RELAY INFORMATION TO HER DURING THE APPOINTMENT. IF DR. COLLADO IS UNABLE TO PROVIDE THIS, PLEASE SEE IF ANOTHER PROVIDER IS WILLING TO DO SO.     Do you require a callback: PLEASE CALL THE PATIENT'S BROTHER BACK IF THE PATIENT NEEDS TO BE RESCHEDULED WITH A DIFFERENT PROVIDER OR TO ADVISE.     THANK YOU.

## 2022-12-29 NOTE — TELEPHONE ENCOUNTER
CONTACTED LATONIA/BROTHER, GAVE MESSAGE FROM DR COLLADO. HE REPORTS THAT THEY ARE VERY THANKFUL FOR ACCOMMODATIONS AND WILL DEFINITELY RESCHEDULE FOR A DIFFERENT DAY IF EITHER OF THEM ARE HAVING ANY SICK SYMPTOMS.

## 2023-01-24 ENCOUNTER — OFFICE VISIT (OUTPATIENT)
Dept: FAMILY MEDICINE CLINIC | Facility: CLINIC | Age: 38
End: 2023-01-24
Payer: COMMERCIAL

## 2023-01-24 VITALS
DIASTOLIC BLOOD PRESSURE: 96 MMHG | BODY MASS INDEX: 31.5 KG/M2 | TEMPERATURE: 98.2 F | HEART RATE: 89 BPM | SYSTOLIC BLOOD PRESSURE: 144 MMHG | HEIGHT: 70 IN | OXYGEN SATURATION: 97 % | WEIGHT: 220 LBS

## 2023-01-24 DIAGNOSIS — E55.9 VITAMIN D DEFICIENCY: ICD-10-CM

## 2023-01-24 DIAGNOSIS — L65.9 HAIR LOSS: ICD-10-CM

## 2023-01-24 DIAGNOSIS — Z13.220 SCREENING FOR HYPERLIPIDEMIA: Primary | ICD-10-CM

## 2023-01-24 DIAGNOSIS — M79.672 LEFT FOOT PAIN: ICD-10-CM

## 2023-01-24 DIAGNOSIS — M67.40 GANGLION CYST: ICD-10-CM

## 2023-01-24 DIAGNOSIS — Z11.59 ENCOUNTER FOR HEPATITIS C SCREENING TEST FOR LOW RISK PATIENT: ICD-10-CM

## 2023-01-24 DIAGNOSIS — Z13.1 SCREENING FOR DIABETES MELLITUS (DM): ICD-10-CM

## 2023-01-24 DIAGNOSIS — D50.9 IRON DEFICIENCY ANEMIA, UNSPECIFIED IRON DEFICIENCY ANEMIA TYPE: ICD-10-CM

## 2023-01-24 PROCEDURE — 99204 OFFICE O/P NEW MOD 45 MIN: CPT | Performed by: INTERNAL MEDICINE

## 2023-01-24 RX ORDER — LORATADINE 10 MG/1
CAPSULE, LIQUID FILLED ORAL
COMMUNITY

## 2023-01-24 NOTE — PROGRESS NOTES
Office Note     Name: Bonita Ellison    : 1985     MRN: [id number]     Chief Complaint  Cough (C/o cough onset 2 months. ) and Establish Care (Pt is here to establish care today. She would like blood work after having some pain in hands and feet. )    Subjective     History of Present Illness:  Bonita Ellison is a 37 y.o. female who presents today for Establish care.    Has had cough x 2 weeks, happens intermittently. Has intermittent wheezing.  Has neer been tested for asthma or COPD  Has not tried any meds or inhalers    Smokes 1 PPD x 18 years.  H    Is overdue for PAP    Has annual cholesterol testign through work byut was told it was borderline and get it re-cheked.    Has histroy of iron deficiency anemia and Vitamin D deficiency during pregnancy, son is 2 years old. Currently taking OTC Hair skin and naills vitami ncurrently    Has had worsening hair loss    6 months of foot pain. Does not recall history of injury, she does stand on her feet 6-7 days a week at work on tile floors.  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  (LEFT foot) hurts  around the left side. Tried lace tie up braces then started having more swelling. Hurts on the top outer left later portion of the foot, does not get heel pain or  Pain on the bottom of the foot. The area looks red and swollen at times. Hurts most when she sits for a while . Is worse first thing in the morning as well.        Saw a foot specialist by [facility] 2 months ago, was put in a boot for a few weeks and hey never gave her a long term plan      Review of Systems:   Review of Systems    Past Medical History:   Past Medical History:   Diagnosis Date   • Cochlear hearing loss    • Hyperlipidemia    • Hypertension        Past Surgical History:   Past Surgical History:   Procedure Laterality Date   • COCHLEAR IMPLANT REVISION     • DENTAL PROCEDURE     • WISDOM TOOTH EXTRACTION         Family History:   Family History   Problem Relation Age of Onset  "  • Hypertension Mother    • COPD Mother    • Hypertension Father    • Diabetes Maternal Grandmother    • Heart disease Maternal Grandmother    • Cancer Maternal Grandfather        Social History:   Social History     Socioeconomic History   • Marital status: Single   Tobacco Use   • Smoking status: Every Day     Packs/day: 1.00     Years: 17.00     Pack years: 17.00     Types: Cigarettes   • Smokeless tobacco: Never   • Tobacco comments:     quit 7/21/19   Vaping Use   • Vaping Use: Never used   Substance and Sexual Activity   • Alcohol use: No   • Drug use: No   • Sexual activity: Yes     Partners: Male       Immunizations:   Immunization History   Administered Date(s) Administered   • COVID-19 (MODERNA) 1st, 2nd, 3rd Dose Only 09/09/2021   • Hep A, Unspecified 03/27/2019   • Hepatitis A 09/27/2018, 03/27/2019        Medications:     Current Outpatient Medications:   •  BIOTIN PO, Take  by mouth., Disp: , Rfl:   •  Loratadine (Claritin) 10 MG capsule, Take  by mouth., Disp: , Rfl:     Allergies:   No Known Allergies    Objective     Vital Signs  /96   Pulse 89   Temp 98.2 °F (36.8 °C)   Ht 177.8 cm (70\")   Wt 99.8 kg (220 lb)   SpO2 97%   BMI 31.57 kg/m²   Estimated body mass index is 31.57 kg/m² as calculated from the following:    Height as of this encounter: 177.8 cm (70\").    Weight as of this encounter: 99.8 kg (220 lb).          Physical Exam  Vitals and nursing note reviewed.   Constitutional:       Appearance: Normal appearance.   Cardiovascular:      Rate and Rhythm: Normal rate and regular rhythm.      Heart sounds: No murmur heard.    No friction rub. No gallop.   Pulmonary:      Effort: Pulmonary effort is normal.      Breath sounds: Normal breath sounds. No wheezing, rhonchi or rales.   Musculoskeletal:        Feet:    Feet:      Comments: Patient has small left  0.5 cm non-tender soft mobile rubber nodule on dorsum of left foot, has a soft swollen area in the adajcent soft tissue which is " indiscreet, no disoclartion  Neurological:      Mental Status: She is alert.      Sensory: Sensation is intact. No sensory deficit.            Procedures     Assessment and Plan     1. Ganglion cyst  - Ambulatory Referral to Orthopedic Surgery    2. Left foot pain  - XR Foot 3+ View Left  - Ambulatory Referral to Orthopedic Surgery    3. Vitamin D deficiency  - Comprehensive Metabolic Panel; Future  - Vitamin D,25-Hydroxy; Future  - Comprehensive Metabolic Panel  - Vitamin D,25-Hydroxy    4. Iron deficiency anemia, unspecified iron deficiency anemia type  - Iron Profile; Future  - CBC & Differential; Future  - Iron Profile  - CBC & Differential    5. Hair loss  - Comprehensive Metabolic Panel; Future  - TSH Rfx On Abnormal To Free T4; Future  - Vitamin B12; Future  - Folate; Future  - Comprehensive Metabolic Panel  - TSH Rfx On Abnormal To Free T4  - Vitamin B12  - Folate    6. Screening for hyperlipidemia  - Lipid Panel; Future  - Lipid Panel    7. Screening for diabetes mellitus (DM)  - Comprehensive Metabolic Panel; Future  - Hemoglobin A1c; Future  - Comprehensive Metabolic Panel  - Hemoglobin A1c    8. Encounter for hepatitis C screening test for low risk patient  - Comprehensive Metabolic Panel; Future  - Hepatitis C Antibody; Future  - Comprehensive Metabolic Panel  - Hepatitis C Antibody          Follow Up  Return in about 2 months (around 3/24/2023) for Annual physical, PAP, .    MD GERRY [facility]  [address] [address] [phone]

## 2023-01-25 LAB
25(OH)D3+25(OH)D2 SERPL-MCNC: 46.2 NG/ML (ref 30–100)
ALBUMIN SERPL-MCNC: 4.4 G/DL (ref 3.8–4.8)
ALBUMIN/GLOB SERPL: 1.9 {RATIO} (ref 1.2–2.2)
ALP SERPL-CCNC: 57 IU/L (ref 44–121)
ALT SERPL-CCNC: 13 IU/L (ref 0–32)
AST SERPL-CCNC: 18 IU/L (ref 0–40)
BASOPHILS # BLD AUTO: 0.1 X10E3/UL (ref 0–0.2)
BASOPHILS NFR BLD AUTO: 1 %
BILIRUB SERPL-MCNC: 0.3 MG/DL (ref 0–1.2)
BUN SERPL-MCNC: 10 MG/DL (ref 6–20)
BUN/CREAT SERPL: 18 (ref 9–23)
CALCIUM SERPL-MCNC: 9.3 MG/DL (ref 8.7–10.2)
CHLORIDE SERPL-SCNC: 104 MMOL/L (ref 96–106)
CHOLEST SERPL-MCNC: 196 MG/DL (ref 100–199)
CO2 SERPL-SCNC: 26 MMOL/L (ref 20–29)
CREAT SERPL-MCNC: 0.55 MG/DL (ref 0.57–1)
EGFRCR SERPLBLD CKD-EPI 2021: 120 ML/MIN/1.73
EOSINOPHIL # BLD AUTO: 0.4 X10E3/UL (ref 0–0.4)
EOSINOPHIL NFR BLD AUTO: 3 %
ERYTHROCYTE [DISTWIDTH] IN BLOOD BY AUTOMATED COUNT: 13.2 % (ref 11.7–15.4)
FOLATE SERPL-MCNC: 13.1 NG/ML
GLOBULIN SER CALC-MCNC: 2.3 G/DL (ref 1.5–4.5)
GLUCOSE SERPL-MCNC: 94 MG/DL (ref 70–99)
HBA1C MFR BLD: 5.6 % (ref 4.8–5.6)
HCT VFR BLD AUTO: 42.1 % (ref 34–46.6)
HCV AB S/CO SERPL IA: 0.3 S/CO RATIO (ref 0–0.9)
HDLC SERPL-MCNC: 74 MG/DL
HGB BLD-MCNC: 13.7 G/DL (ref 11.1–15.9)
IMM GRANULOCYTES # BLD AUTO: 0 X10E3/UL (ref 0–0.1)
IMM GRANULOCYTES NFR BLD AUTO: 0 %
IRON SATN MFR SERPL: 22 % (ref 15–55)
IRON SERPL-MCNC: 79 UG/DL (ref 27–159)
LDLC SERPL CALC-MCNC: 103 MG/DL (ref 0–99)
LYMPHOCYTES # BLD AUTO: 3.7 X10E3/UL (ref 0.7–3.1)
LYMPHOCYTES NFR BLD AUTO: 31 %
MCH RBC QN AUTO: 28.3 PG (ref 26.6–33)
MCHC RBC AUTO-ENTMCNC: 32.5 G/DL (ref 31.5–35.7)
MCV RBC AUTO: 87 FL (ref 79–97)
MONOCYTES # BLD AUTO: 0.7 X10E3/UL (ref 0.1–0.9)
MONOCYTES NFR BLD AUTO: 6 %
NEUTROPHILS # BLD AUTO: 7.2 X10E3/UL (ref 1.4–7)
NEUTROPHILS NFR BLD AUTO: 59 %
PLATELET # BLD AUTO: 317 X10E3/UL (ref 150–450)
POTASSIUM SERPL-SCNC: 4.3 MMOL/L (ref 3.5–5.2)
PROT SERPL-MCNC: 6.7 G/DL (ref 6–8.5)
RBC # BLD AUTO: 4.84 X10E6/UL (ref 3.77–5.28)
SODIUM SERPL-SCNC: 143 MMOL/L (ref 134–144)
TIBC SERPL-MCNC: 361 UG/DL (ref 250–450)
TRIGL SERPL-MCNC: 107 MG/DL (ref 0–149)
TSH SERPL DL<=0.005 MIU/L-ACNC: 0.96 UIU/ML (ref 0.45–4.5)
UIBC SERPL-MCNC: 282 UG/DL (ref 131–425)
VIT B12 SERPL-MCNC: 338 PG/ML (ref 232–1245)
VLDLC SERPL CALC-MCNC: 19 MG/DL (ref 5–40)
WBC # BLD AUTO: 12.1 X10E3/UL (ref 3.4–10.8)

## 2023-02-07 ENCOUNTER — OFFICE VISIT (OUTPATIENT)
Dept: ORTHOPEDIC SURGERY | Facility: CLINIC | Age: 38
End: 2023-02-07
Payer: COMMERCIAL

## 2023-02-07 VITALS
WEIGHT: 220 LBS | DIASTOLIC BLOOD PRESSURE: 81 MMHG | SYSTOLIC BLOOD PRESSURE: 122 MMHG | HEIGHT: 70 IN | BODY MASS INDEX: 31.5 KG/M2

## 2023-02-07 DIAGNOSIS — M77.52 OS PERONEUM SYNDROME OF LEFT FOOT: ICD-10-CM

## 2023-02-07 DIAGNOSIS — M21.622 BUNIONETTE OF LEFT FOOT: ICD-10-CM

## 2023-02-07 DIAGNOSIS — E66.9 OBESITY (BMI 30-39.9): Primary | ICD-10-CM

## 2023-02-07 PROCEDURE — 99204 OFFICE O/P NEW MOD 45 MIN: CPT | Performed by: STUDENT IN AN ORGANIZED HEALTH CARE EDUCATION/TRAINING PROGRAM

## 2023-02-07 RX ORDER — METHYLPREDNISOLONE 4 MG/1
TABLET ORAL
Qty: 21 TABLET | Refills: 0 | Status: SHIPPED | OUTPATIENT
Start: 2023-02-07 | End: 2023-03-28

## 2023-02-07 NOTE — PROGRESS NOTES
[facility] Office Visit     Office Visit       Date: 02/07/2023   Patient Name: Bonita Ellison  MRN: [id number]  YOB: 1985    Referring Physician: Maria Victoria Flores MD     Chief Complaint:   Chief Complaint   Patient presents with   • Left Foot - Pain, Initial Evaluation     Dorsum and 4th and 5th toes       History of Present Illness:   Bonita Ellison is a 37 y.o. female who presents with new problem of: left foot dorsum pain.  Onset: atraumatic and gradual in nature. The issue has been ongoing for 6 month(s). Pain is a 9/10 on the pain scale. Pain is described as aching and shooting. Associated symptoms include pain, swelling, popping, stiffness. The pain is worse with walking, standing, sitting, climbing stairs, sleeping, working, rising from seated position and any movement of the joint; ice improve the pain. Previous treatments have included: bracing, NSAIDS and weight loss.    Subjective   Review of Systems: Review of Systems   Constitutional: Positive for fatigue.   HENT: Positive for congestion, dental problem, sinus pressure, sneezing and tinnitus.    Respiratory: Positive for wheezing.    Musculoskeletal: Positive for back pain.   Neurological: Positive for numbness.        Numbness hands   All other systems reviewed and are negative.       I have reviewed the following portions of the patient's history:History of Present Illness and review of systems.    Past Medical History:   Past Medical History:   Diagnosis Date   • Cochlear hearing loss    • Hyperlipidemia    • Hypertension        Past Surgical History:   Past Surgical History:   Procedure Laterality Date   • COCHLEAR IMPLANT REVISION Right    • DENTAL PROCEDURE     • WISDOM TOOTH EXTRACTION         Family History:   Family History   Problem Relation Age of Onset   • Hypertension Mother    • COPD Mother    • Hypertension Father    • Diabetes Maternal Grandmother    • Heart disease  "Maternal Grandmother    • Cancer Maternal Grandfather        Social History:   Social History     Socioeconomic History   • Marital status: Single   Tobacco Use   • Smoking status: Every Day     Packs/day: 1.00     Years: 17.00     Pack years: 17.00     Types: Cigarettes   • Smokeless tobacco: Never   • Tobacco comments:     quit 7/21/19   Vaping Use   • Vaping Use: Never used   Substance and Sexual Activity   • Alcohol use: No   • Drug use: No   • Sexual activity: Yes     Partners: Male       Medications:   Current Outpatient Medications:   •  BIOTIN PO, Take  by mouth., Disp: , Rfl:   •  Loratadine 10 MG capsule, Take  by mouth., Disp: , Rfl:   •  Multiple Vitamins-Minerals (MULTI-VITAMIN GUMMIES PO), Take  by mouth., Disp: , Rfl:   •  methylPREDNISolone (MEDROL) 4 MG dose pack, Take as directed on package instructions., Disp: 21 tablet, Rfl: 0    Allergies: No Known Allergies    I reviewed the patient's chief complaint, history of present illness, review of systems, past medical history, surgical history, family history, social history, medications and allergy list.     Objective    Vital Signs:   Vitals:    02/07/23 1601   BP: 122/81   Weight: 99.8 kg (220 lb)   Height: 177.8 cm (70\")     Body mass index is 31.57 kg/m².   BMI is >= 30 and <35. (Class 1 Obesity). The following options were offered after discussion;: exercise counseling/recommendations and nutrition counseling/recommendations     In this visit the patient was advised to stop smoking and was offered tobacco cessation measures and resources, including NRT and/or medication intervention. At least 5 minutes was spent on face-to-face counseling regarding smoking cessation.     Ortho Exam:  Constitutional: General Appearance: healthy-appearing, NAD, and normal body habitus.   Psychiatric: Orientation: oriented to time, place, and person. Mood and Affect: normal mood and affect and active and alert.   Cardiovascular System: Arterial Pulses Left: " posterior tibialis normal and dorsalis pedis normal.  Edema Left: no edema. Varicosities Left: no varicosities and capillary refill test normal.   Gait and Station: Appearance: limp and ambulating with no assistive devices.   Ankles and Feet: Inspection Left: Bony prominence at lateral midfoot. No erythema, induration, warmth, or deformity and normal alignment and swelling (lateral). Inspection Left: no erythema, induration, swelling, warmth, or deformity and normal alignment. Bony Palpation of the Ankle/Foot Left: no tenderness of the inferior tibiofibular joint or the achilles tendon insertion. Soft Tissue Palpation of the Ankle/Foot Left: tenderness of the peroneus longus and brevis and the peroneal retinaculum. Active Range of Motion Left: Painful and limited inversion, eversion, and dorsiflexion. Stability Left: anterior drawer negative and talar tilt negative.  Strength Left: peroneus longus 4/5 and brevis 4/5 and posterior tibialis (5/5), extensor hallucis longus (5/5), tibialis anterior (5/5), and gastrocnemius (5/5).   Neurological System: Sensation on the Left: normal distal extremities.     Results Review:   Imaging Results (Last 24 Hours)     ** No results found for the last 24 hours. **      XR Foot 3+ View Left (01/24/2023 10:01)  I personally reviewed the radiographs of the left foot.  No acute fracture or dislocation.  She has an os perineum the lateral midfoot.  There is a enthesophyte at the Achilles insertion onto the calcaneus.  Bunionette present at the fifth MTP joint.    Procedures    Assessment / Plan    Assessment/Plan:   Diagnoses and all orders for this visit:    1. 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  Dispense: 21 tablet; Refill: 0  -     Ambulatory Referral to Physical Therapy Evaluate and treat, Ortho; Electrotherapy; Iontophoresis, E-stim; Soft Tissue  Mobilizaton; Strengthening, ROM, Stretching; Left; Full weight bearing    3. Bunionette of left foot      Multiple months of painful lateral midfoot pain that is worsened by increased weightbearing activity.  She works as a  and stands for long periods of time.  At the end of the day, her foot is in extreme pain and she has difficulty moving and virtually all directions.  Her radiographs show both an os perineum and developing bunionette on the lateral aspect of her foot.  Exam shows both areas to be palpable on the foot.  I showed the patient her radiographs and reviewed their location on her foot.  She is quite tender with inversion and eversion at that site.  I discussed with her the diagnosis of os peroneum.  I recommend activity modification to include more seated work.  Given her limp, I plan to place her into a short cam walking boot to offload.  I provided her with a note for work requesting that she be able to use both the walking boot and sit more frequently.  I will try to treat the pain and inflammation with a Medrol Dosepak today.  She was also provided with a physical therapy note for additional treatments.  Treatment for the bunionette may be more long-term.  She is not overtly tender at the fifth toe today but does have symptoms intermittently.  We will discuss these again further in the future.  I plan to see her back in 4 weeks to monitor her response.    Past documentation reviewed: Niels Flores MD-1/24/2023-office visit.    Past laboratories notes reviewed: 1/24/2023-hemoglobin A1c 5.6%, creatinine 0.55, EGFR 120.    Previous radiographs reviewed: Radiographs of the left foot from 1/24/2023.    Follow Up:   Return in about 4 weeks (around 3/7/2023) for Recheck.      Good Mcgowan MD  [facility]

## 2023-03-02 ENCOUNTER — TELEPHONE (OUTPATIENT)
Dept: ORTHOPEDIC SURGERY | Facility: CLINIC | Age: 38
End: 2023-03-02

## 2023-03-02 NOTE — TELEPHONE ENCOUNTER
Caller: [facility]    Relationship to patient:     Best call back number: [phone]    Patient is needing: RECEIVED CALL FROM [facility]- THEY ADVISED THEY ARE NOT IN NETWORK WITH THE PATIENTS INSURANCE- AND SHE ALSO HAS A DEDUCTIBLE- THEY ARE NOT ABLE TO CONTACT THE PATIENT AS SHE IS HEARING IMPAIRED- [facility] IS WITHDRAWING AS A POSSIBLE CANDIDATE FOR PT FOR THIS PATIENT DUE TO HER INSURANCE BEING OUT OF NETWORK FOR THEIR COMPANY.      PLEASE CONTACT PATIENT TO ADVISE.

## 2023-03-28 ENCOUNTER — OFFICE VISIT (OUTPATIENT)
Dept: FAMILY MEDICINE CLINIC | Facility: CLINIC | Age: 38
End: 2023-03-28
Payer: COMMERCIAL

## 2023-03-28 VITALS
HEIGHT: 70 IN | BODY MASS INDEX: 30.35 KG/M2 | DIASTOLIC BLOOD PRESSURE: 84 MMHG | OXYGEN SATURATION: 96 % | WEIGHT: 212 LBS | HEART RATE: 85 BPM | SYSTOLIC BLOOD PRESSURE: 126 MMHG

## 2023-03-28 DIAGNOSIS — Z12.4 SCREENING FOR CERVICAL CANCER: ICD-10-CM

## 2023-03-28 DIAGNOSIS — Z96.21 COCHLEAR IMPLANT IN PLACE: ICD-10-CM

## 2023-03-28 DIAGNOSIS — Z09 NEED FOR IMMUNIZATION FOLLOW-UP: ICD-10-CM

## 2023-03-28 DIAGNOSIS — Z00.00 GENERAL MEDICAL EXAM: Primary | ICD-10-CM

## 2023-03-28 NOTE — PROGRESS NOTES
Office Note     Name: Bonita Ellsion    : 1985     MRN: [id number]     Chief Complaint  Annual Exam (Pt is her for a PE with pap today. )    Subjective     History of Present Illness:  Bonita Ellison is a 37 y.o. female who presents today for annual exam.    Last PAP: overdue .    Was seen at [facility] at was diagnosed with trichomonas about a annette ago and resolved after treatment    Diet/Physical activity:Good    PHQ-2 Depression Screening  Little interest or pleasure in doing things? 0-->not at all0   Feeling down, depressed, or hopeless? 0-->not at all0   PHQ-2 Total Score 00             Review of Systems:   Review of Systems    Past Medical History:   Past Medical History:   Diagnosis Date   • Cochlear hearing loss    • Hyperlipidemia    • Hypertension        Past Surgical History:   Past Surgical History:   Procedure Laterality Date   • COCHLEAR IMPLANT REVISION Right    • DENTAL PROCEDURE     • WISDOM TOOTH EXTRACTION         Family History:   Family History   Problem Relation Age of Onset   • Hypertension Mother    • COPD Mother    • Hypertension Father    • Diabetes Maternal Grandmother    • Heart disease Maternal Grandmother    • Cancer Maternal Grandfather        Social History:   Social History     Socioeconomic History   • Marital status: Single   Tobacco Use   • Smoking status: Every Day     Packs/day: 1.00     Years: 17.00     Pack years: 17.00     Types: Cigarettes   • Smokeless tobacco: Never   • Tobacco comments:     quit 18   Vaping Use   • Vaping Use: Never used   Substance and Sexual Activity   • Alcohol use: No   • Drug use: No   • Sexual activity: Yes     Partners: Male       Immunizations:   Immunization History   Administered Date(s) Administered   • COVID-19 (MODERNA) 1st, 2nd, 3rd Dose Only 2021   • Hep A, Unspecified 2019   • Hepatitis A 2018, 2019   • Pneumococcal Conjugate 20-Valent (PCV20) 2023   • Tdap 10/01/2020        Medications:     Current  "Outpatient Medications:   •  BIOTIN PO, Take  by mouth., Disp: , Rfl:   •  Loratadine 10 MG capsule, Take  by mouth., Disp: , Rfl:   •  Multiple Vitamins-Minerals (MULTI-VITAMIN GUMMIES PO), Take  by mouth., Disp: , Rfl:     Allergies:   No Known Allergies    Objective     Vital Signs  /84   Pulse 85   Ht 177.8 cm (70\")   Wt 96.2 kg (212 lb)   SpO2 96%   BMI 30.42 kg/m²   Estimated body mass index is 30.42 kg/m² as calculated from the following:    Height as of this encounter: 177.8 cm (70\").    Weight as of this encounter: 96.2 kg (212 lb).          Physical Exam  Vitals and nursing note reviewed. Exam conducted with a chaperone present.   Constitutional:       Appearance: Normal appearance.   Cardiovascular:      Rate and Rhythm: Normal rate and regular rhythm.      Heart sounds: Normal heart sounds.   Pulmonary:      Effort: Pulmonary effort is normal.      Breath sounds: Normal breath sounds.   Chest:   Breasts:     Right: No inverted nipple, mass, nipple discharge or skin change.      Left: No inverted nipple, mass, nipple discharge or skin change.   Abdominal:      General: Bowel sounds are normal.      Palpations: Abdomen is soft.   Genitourinary:     General: Normal vulva.      Exam position: Supine.      Labia:         Right: No lesion.         Left: No lesion.       Cervix: No cervical motion tenderness, discharge or lesion.      Adnexa:         Right: No mass or tenderness.          Left: No mass or tenderness.        Comments: Cervical os difficult to visualize, No cervical motion tenderness, scant amount of dischagre, no abnormal cervical mucosa or lesions  Lymphadenopathy:      Upper Body:      Right upper body: No supraclavicular or axillary adenopathy.      Left upper body: No supraclavicular or axillary adenopathy.   Neurological:      Mental Status: She is alert.              Procedures       Colorectal Screening:     Last Completed Colonoscopy     This patient has no relevant Health " Maintenance data.        Pap:    Last Completed Pap Smear     This patient has no relevant Health Maintenance data.         Mammogram:    Last Completed Mammogram     This patient has no relevant Health Maintenance data.           Bone Density/DEXA: NA              Assessment and Plan     Annual Preventative Exam    Healthcare Maintenance:   Bonita Ellison voices understanding and acceptance of this advice and will call back with any further questions or concerns. AVS with preventive healthcare tips printed for patient.     Anticipatory Guidance:  19 to 39: Counseling/Anticipatory Guidance Discussed: immunizations    1. General medical exam      2. Screening for cervical cancer    - IGP, Rfx Aptima HPV ASCU    3. Need for immunization follow-up      4. Cochlear implant in place        Follow Up  Return for Annual physical.    MD GERRY [facility]  [address] [address] [phone]

## 2023-04-04 ENCOUNTER — OFFICE VISIT (OUTPATIENT)
Dept: ORTHOPEDIC SURGERY | Facility: CLINIC | Age: 38
End: 2023-04-04
Payer: COMMERCIAL

## 2023-04-04 VITALS
BODY MASS INDEX: 30.35 KG/M2 | DIASTOLIC BLOOD PRESSURE: 76 MMHG | SYSTOLIC BLOOD PRESSURE: 122 MMHG | WEIGHT: 212 LBS | HEIGHT: 70 IN

## 2023-04-04 DIAGNOSIS — M77.52 OS PERONEUM SYNDROME OF LEFT FOOT: Primary | ICD-10-CM

## 2023-04-04 LAB
CONV .: NORMAL
CYTOLOGIST CVX/VAG CYTO: NORMAL
CYTOLOGY CVX/VAG DOC CYTO: NORMAL
CYTOLOGY CVX/VAG DOC THIN PREP: NORMAL
DX ICD CODE: NORMAL
HIV 1 & 2 AB SER-IMP: NORMAL
OTHER STN SPEC: NORMAL
STAT OF ADQ CVX/VAG CYTO-IMP: NORMAL

## 2023-04-04 PROCEDURE — 99213 OFFICE O/P EST LOW 20 MIN: CPT | Performed by: STUDENT IN AN ORGANIZED HEALTH CARE EDUCATION/TRAINING PROGRAM

## 2023-04-04 RX ORDER — IBUPROFEN 800 MG/1
800 TABLET ORAL 3 TIMES DAILY
Qty: 90 TABLET | Refills: 1 | Status: SHIPPED | OUTPATIENT
Start: 2023-04-04

## 2023-04-04 NOTE — PROGRESS NOTES
[facility] [place] Office Follow Up Visit     Office Follow Up      Date: 04/04/2023   Patient Name: Bonita Ellison  MRN: [id number]  YOB: 1985    Referring Physician: No ref. provider found     Chief Complaint:   Chief Complaint   Patient presents with   • Follow-up     Os peroneum syndrome of left foot      History of Present Illness: Bonita Ellison is a 37 y.o. female who is here today for follow up on left foot pain from painful os peroneum syndrome.  At last visit, she was placed into a walking boot, given Medrol Dosepak, and restrictions for standing at work.  Fortunately, she has not been able to do those restrictions at work.  Her pain is still a 5/10 and located at the lateral midfoot.  Symptoms are overall unchanged since the last visit.    Subjective   Review of Systems: Review of Systems   Constitutional: Positive for fatigue. Negative for chills, fever, unexpected weight gain and unexpected weight loss.   HENT: Negative.  Negative for congestion, postnasal drip and rhinorrhea.    Eyes: Negative.  Negative for blurred vision.   Respiratory: Negative.  Negative for shortness of breath.    Cardiovascular: Negative.  Negative for leg swelling.   Gastrointestinal: Negative.  Negative for abdominal pain, nausea and vomiting.   Endocrine: Negative.    Genitourinary: Negative.  Negative for difficulty urinating.   Musculoskeletal: Positive for arthralgias. Negative for gait problem, joint swelling and myalgias.   Skin: Negative for skin lesions and wound.   Neurological: Negative for dizziness, weakness, light-headedness and numbness.   Hematological: Negative.  Does not bruise/bleed easily.   Psychiatric/Behavioral: Negative.  Negative for depressed mood.   All other systems reviewed and are negative.       Medications:   Current Outpatient Medications:   •  BIOTIN PO, Take  by mouth., Disp: , Rfl:   •  Loratadine 10 MG capsule, Take  by mouth., Disp: ,  "Rfl:   •  Multiple Vitamins-Minerals (MULTI-VITAMIN GUMMIES PO), Take  by mouth., Disp: , Rfl:   •  ibuprofen (ADVIL,MOTRIN) 800 MG tablet, Take 1 tablet by mouth 3 (Three) Times a Day., Disp: 90 tablet, Rfl: 1    Allergies: No Known Allergies    I have reviewed and updated the patient's chief complaint, history of present illness, review of systems, past medical history, surgical history, family history, social history, medications and allergy list as appropriate.     Objective    Vital Signs:   Vitals:    04/04/23 1503   BP: 122/76   Weight: 96.2 kg (212 lb)   Height: 177.8 cm (70\")     Body mass index is 30.42 kg/m².  BMI is >= 30 and <35. (Class 1 Obesity). The following options were offered after discussion;: exercise counseling/recommendations and nutrition counseling/recommendations    In this visit the patient was advised to stop smoking and was offered tobacco cessation measures and resources, including NRT and/or medication intervention. At least 3 minutes was spent on face-to-face counseling regarding smoking cessation.      Ortho Exam:  Constitutional: General Appearance: healthy-appearing, NAD, and normal body habitus.   Psychiatric: Orientation: oriented to time, place, and person. Mood and Affect: normal mood and affect and active and alert.   Cardiovascular System: Arterial Pulses Left: posterior tibialis normal and dorsalis pedis normal.  Edema Left: no edema. Varicosities Left: no varicosities and capillary refill test normal.   Gait and Station: Appearance: limp and ambulating with no assistive devices.   Ankles and Feet: Inspection Left: Bony prominence at lateral midfoot. No erythema, induration, warmth, or deformity and normal alignment and swelling (lateral). Inspection Left: no erythema, induration, swelling, warmth, or deformity and normal alignment. Bony Palpation of the Ankle/Foot Left: no tenderness of the inferior tibiofibular joint or the achilles tendon insertion. Soft Tissue Palpation " of the Ankle/Foot Left: tenderness of the peroneus longus and brevis and the peroneal retinaculum. Active Range of Motion Left: Painful and limited inversion, eversion, and dorsiflexion. Stability Left: anterior drawer negative and talar tilt negative.  Strength Left: peroneus longus 4/5 and brevis 4/5 and posterior tibialis (5/5), extensor hallucis longus (5/5), tibialis anterior (5/5), and gastrocnemius (5/5).   Neurological System: Sensation on the Left: normal distal extremities.    Results Review:   Imaging Results (Last 24 Hours)     ** No results found for the last 24 hours. **        Procedures    Assessment / Plan    Assessment/Plan:   Diagnoses and all orders for this visit:    1. Os peroneum syndrome of left foot (Primary)  -     ibuprofen (ADVIL,MOTRIN) 800 MG tablet; Take 1 tablet by mouth 3 (Three) Times a Day.  Dispense: 90 tablet; Refill: 1      Follow-up on left foot pain from os perineum syndrome.  No significant change in symptoms despite offloading in a walking boot and Medrol Dosepak.  Did have improvement with the medication while on it but symptoms returned after completing the course.  Remains tender at the lateral midfoot and worsened by motion.  She has not been able to comply with standing restrictions unfortunately.  She needs to work to provide for family.  I certainly understand this and we had a honest conversation about the level of activity leading to the symptoms.  I understand her position and we will do our best to help with her pain.  We will have her come out of the boot now but if she goes back to limping, she can return to the boot for 1 to 2 days at a time.  I will start her on scheduled anti-inflammatory medication.  We will discuss with our foot and ankle colleagues for additional insight.  Will not schedule formal follow-up at this time but am happy to see her back at any time for her pain.    Follow Up:   Return if symptoms worsen or fail to improve.      Good Mcgowan,  MD  [facility]

## 2023-05-12 ENCOUNTER — OFFICE VISIT (OUTPATIENT)
Dept: FAMILY MEDICINE CLINIC | Facility: CLINIC | Age: 38
End: 2023-05-12
Payer: COMMERCIAL

## 2023-05-12 VITALS
HEIGHT: 70 IN | BODY MASS INDEX: 29.06 KG/M2 | SYSTOLIC BLOOD PRESSURE: 142 MMHG | DIASTOLIC BLOOD PRESSURE: 86 MMHG | OXYGEN SATURATION: 97 % | WEIGHT: 203 LBS | HEART RATE: 98 BPM

## 2023-05-12 DIAGNOSIS — L30.9 DERMATITIS: Primary | ICD-10-CM

## 2023-05-12 PROCEDURE — 99213 OFFICE O/P EST LOW 20 MIN: CPT | Performed by: INTERNAL MEDICINE

## 2023-05-12 RX ORDER — PERMETHRIN 50 MG/G
1 CREAM TOPICAL ONCE
Qty: 1 G | Refills: 1 | Status: SHIPPED | OUTPATIENT
Start: 2023-05-12 | End: 2023-05-12

## 2023-05-12 RX ORDER — HYDROXYZINE HYDROCHLORIDE 25 MG/1
25 TABLET, FILM COATED ORAL 2 TIMES DAILY PRN
Qty: 30 TABLET | Refills: 0 | Status: SHIPPED | OUTPATIENT
Start: 2023-05-12

## 2023-05-12 NOTE — PROGRESS NOTES
Office Note     Name: Bonita Ellison    : 1985     MRN: [id number]     Chief Complaint  Head Lice (Pt concerned with head lice?)    Subjective     History of Present Illness:  Bonita Ellison is a 37 y.o. female who presents today for persistent rash.    Has been itching all over with rash, started on limbs then spread. Has also had extremely itcht scalp but has not seen any live bugs in the hair.      Had a friend who is an  check the house for bed bugs fleas etc and did not see anything    Review of Systems:   Review of Systems    Past Medical History:   Past Medical History:   Diagnosis Date    Cochlear hearing loss     Hyperlipidemia     Hypertension        Past Surgical History:   Past Surgical History:   Procedure Laterality Date    COCHLEAR IMPLANT REVISION Right     DENTAL PROCEDURE      WISDOM TOOTH EXTRACTION         Family History:   Family History   Problem Relation Age of Onset    Hypertension Mother     COPD Mother     Hypertension Father     Diabetes Maternal Grandmother     Heart disease Maternal Grandmother     Cancer Maternal Grandfather        Social History:   Social History     Socioeconomic History    Marital status: Single   Tobacco Use    Smoking status: Every Day     Packs/day: 1.00     Years: 17.00     Pack years: 17.00     Types: Cigarettes    Smokeless tobacco: Never    Tobacco comments:     quit 18   Vaping Use    Vaping Use: Never used   Substance and Sexual Activity    Alcohol use: No    Drug use: No    Sexual activity: Yes     Partners: Male       Immunizations:   Immunization History   Administered Date(s) Administered    COVID-19 (MODERNA) 1st,2nd,3rd Dose Monovalent 2021    Hep A, Unspecified 2019    Hepatitis A 2018, 2019    Pneumococcal Conjugate 20-Valent (PCV20) 2023    Tdap 10/01/2020        Medications:     Current Outpatient Medications:     ibuprofen (ADVIL,MOTRIN) 800 MG tablet, Take 1 tablet by mouth 3 (Three) Times a  "Day., Disp: 90 tablet, Rfl: 1    Multiple Vitamins-Minerals (MULTI-VITAMIN GUMMIES PO), Take  by mouth., Disp: , Rfl:     Allergies:   No Known Allergies    Objective     Vital Signs  /86   Pulse 98   Ht 177.8 cm (70\")   Wt 92.1 kg (203 lb)   SpO2 97%   BMI 29.13 kg/m²   Estimated body mass index is 29.13 kg/m² as calculated from the following:    Height as of this encounter: 177.8 cm (70\").    Weight as of this encounter: 92.1 kg (203 lb).          Physical Exam  Vitals and nursing note reviewed.   Constitutional:       Appearance: Normal appearance.   Cardiovascular:      Rate and Rhythm: Normal rate and regular rhythm.      Heart sounds: No murmur heard.    No friction rub. No gallop.   Pulmonary:      Effort: Pulmonary effort is normal.      Breath sounds: Normal breath sounds. No wheezing, rhonchi or rales.   Neurological:      Mental Status: She is alert.          Procedures     Assessment and Plan     1. Dermatitis    - permethrin (ELIMITE) 5 % cream; Apply 1 application topically to the appropriate area as directed 1 (One) Time for 1 dose.  Dispense: 1 g; Refill: 1  - hydrOXYzine (ATARAX) 25 MG tablet; Take 1 tablet by mouth 2 (Two) Times a Day As Needed for Itching.  Dispense: 30 tablet; Refill: 0       Follow Up  Return if symptoms worsen or fail to improve.    Patient was given instructions and counseling regarding her condition or for health maintenance advice. Please see specific information pulled into the AVS if appropriate.     MD JACKELINE WomackE [facility]  [address] [place] [id number] [phone]  "

## 2024-03-12 ENCOUNTER — OFFICE VISIT (OUTPATIENT)
Dept: FAMILY MEDICINE CLINIC | Facility: CLINIC | Age: 39
End: 2024-03-12
Payer: COMMERCIAL

## 2024-03-12 VITALS
HEIGHT: 70 IN | HEART RATE: 100 BPM | SYSTOLIC BLOOD PRESSURE: 110 MMHG | DIASTOLIC BLOOD PRESSURE: 80 MMHG | WEIGHT: 210 LBS | TEMPERATURE: 97.8 F | OXYGEN SATURATION: 99 % | BODY MASS INDEX: 30.06 KG/M2

## 2024-03-12 DIAGNOSIS — G89.29 CHRONIC BILATERAL LOW BACK PAIN WITHOUT SCIATICA: ICD-10-CM

## 2024-03-12 DIAGNOSIS — M54.50 CHRONIC BILATERAL LOW BACK PAIN WITHOUT SCIATICA: ICD-10-CM

## 2024-03-12 DIAGNOSIS — R30.0 BURNING WITH URINATION: Primary | ICD-10-CM

## 2024-03-12 LAB
BILIRUB BLD-MCNC: NEGATIVE MG/DL
CLARITY, POC: CLEAR
COLOR UR: YELLOW
GLUCOSE UR STRIP-MCNC: NEGATIVE MG/DL
KETONES UR QL: NEGATIVE
LEUKOCYTE EST, POC: ABNORMAL
NITRITE UR-MCNC: NEGATIVE MG/ML
PH UR: 6.5 [PH] (ref 5–8)
PROT UR STRIP-MCNC: NEGATIVE MG/DL
RBC # UR STRIP: NEGATIVE /UL
SP GR UR: 1.01 (ref 1–1.03)
UROBILINOGEN UR QL: NORMAL

## 2024-03-12 PROCEDURE — 99214 OFFICE O/P EST MOD 30 MIN: CPT | Performed by: PHYSICIAN ASSISTANT

## 2024-03-12 PROCEDURE — 81002 URINALYSIS NONAUTO W/O SCOPE: CPT | Performed by: PHYSICIAN ASSISTANT

## 2024-03-12 RX ORDER — FLUCONAZOLE 150 MG/1
150 TABLET ORAL ONCE
Qty: 1 TABLET | Refills: 0 | Status: SHIPPED | OUTPATIENT
Start: 2024-03-12 | End: 2024-03-12

## 2024-03-12 RX ORDER — CEFDINIR 300 MG/1
300 CAPSULE ORAL 2 TIMES DAILY
Qty: 14 CAPSULE | Refills: 0 | Status: SHIPPED | OUTPATIENT
Start: 2024-03-12

## 2024-03-12 NOTE — PROGRESS NOTES
"Chief Complaint  burning with urination (Burning with urination for 2-3 weeks )    Subjective          Bonita Ellison presents to [facility]  History of Present Illness  Patient in today to discuss intermittent episodes of dysuria for past 2-3 weeks. Denies fever. No vomiting or diarrhea. She states also has had lower back pain for past several months- sore to move and with walking at times. Denies any pain into legs or numbness. Denies known injury- states has seen chiropractor in past. LMP on 2/25/2024- states cycles are heavy- may follow back up with gynecology regarding.       Objective   Vital Signs:   /80   Pulse 100   Temp 97.8 °F (36.6 °C)   Ht 177.8 cm (70\")   Wt 95.3 kg (210 lb)   SpO2 99%   BMI 30.13 kg/m²     Body mass index is 30.13 kg/m².    Review of Systems   Constitutional:  Negative for fever.   Respiratory:  Negative for shortness of breath.    Cardiovascular:  Negative for chest pain.   Gastrointestinal:  Negative for abdominal pain, diarrhea, nausea and vomiting.   Genitourinary:  Positive for dysuria and frequency.   Musculoskeletal:  Positive for back pain.   Neurological:  Negative for numbness and headache.       Past History:  Medical History: has a past medical history of Cochlear hearing loss, Hyperlipidemia, and Hypertension.   Surgical History: has a past surgical history that includes Dental surgery; Cochlear implant revision (Right); and [place] tooth extraction.   Family History: family history includes COPD in her mother; Cancer in her maternal grandfather; Diabetes in her maternal grandmother; Heart disease in her maternal grandmother; Hypertension in her father and mother.   Social History: reports that she has been smoking cigarettes. She has a 17 pack-year smoking history. She has never used smokeless tobacco. She reports that she does not drink alcohol and does not use drugs.      Current Outpatient Medications:     ibuprofen " (ADVIL,MOTRIN) 800 MG tablet, Take 1 tablet by mouth 3 (Three) Times a Day., Disp: 90 tablet, Rfl: 1    Multiple Vitamins-Minerals (MULTI-VITAMIN GUMMIES PO), Take  by mouth., Disp: , Rfl:   Allergies: Patient has no known allergies.    Physical Exam  Constitutional:       Appearance: Normal appearance.   HENT:      Right Ear: Tympanic membrane normal.      Left Ear: Tympanic membrane normal.      Mouth/Throat:      Pharynx: Oropharynx is clear.   Eyes:      Conjunctiva/sclera: Conjunctivae normal.      Pupils: Pupils are equal, round, and reactive to light.   Cardiovascular:      Rate and Rhythm: Normal rate and regular rhythm.      Heart sounds: Normal heart sounds.   Pulmonary:      Effort: Pulmonary effort is normal.      Breath sounds: Normal breath sounds.   Abdominal:      Palpations: Abdomen is soft.      Tenderness: There is no abdominal tenderness. There is no right CVA tenderness or left CVA tenderness.   Musculoskeletal:      Comments: Tender to palpate musculature bilateral lower back; FROM of back ; walks with normal gait    Neurological:      Mental Status: She is oriented to person, place, and time.   Psychiatric:         Mood and Affect: Mood normal.         Behavior: Behavior normal.             Assessment and Plan   Diagnoses and all orders for this visit:    1. Burning with urination (Primary)  -     POC Urinalysis Dipstick  Will start on antibiotic until culture has returned; encouraged good hydration; rtc or to ER if symptoms not improving   2. Chronic bilateral low back pain without sciatica  -     XR Spine Lumbar 2 or 3 View (In Office)  Will check xray of lumbar spine today; heat/ice/stretching to area; discussed physical therapy but she states does not have time or avail to go at this time.           Follow Up   No follow-ups on file.  Patient was given instructions and counseling regarding her condition or for health maintenance advice. Please see specific information pulled into the AVS  if appropriate.     Ira You PA-C

## 2025-01-23 ENCOUNTER — TELEPHONE (OUTPATIENT)
Dept: FAMILY MEDICINE CLINIC | Facility: CLINIC | Age: 40
End: 2025-01-23
Payer: COMMERCIAL